# Patient Record
Sex: FEMALE | Race: WHITE | NOT HISPANIC OR LATINO | Employment: OTHER | URBAN - METROPOLITAN AREA
[De-identification: names, ages, dates, MRNs, and addresses within clinical notes are randomized per-mention and may not be internally consistent; named-entity substitution may affect disease eponyms.]

---

## 2017-01-16 PROBLEM — Z79.01 CHRONIC ANTICOAGULATION: Status: ACTIVE | Noted: 2017-01-16

## 2017-01-16 PROBLEM — F03.90 DEMENTIA: Status: ACTIVE | Noted: 2017-01-16

## 2017-01-16 PROBLEM — Z87.19 HISTORY OF GASTROINTESTINAL BLEEDING: Status: ACTIVE | Noted: 2017-01-16

## 2018-01-25 ENCOUNTER — OFFICE VISIT (OUTPATIENT)
Dept: CARDIOLOGY | Facility: CLINIC | Age: 83
End: 2018-01-25
Attending: INTERNAL MEDICINE
Payer: MEDICARE

## 2018-01-25 VITALS
HEIGHT: 64 IN | HEART RATE: 114 BPM | SYSTOLIC BLOOD PRESSURE: 143 MMHG | WEIGHT: 145 LBS | DIASTOLIC BLOOD PRESSURE: 84 MMHG | BODY MASS INDEX: 24.75 KG/M2

## 2018-01-25 DIAGNOSIS — E11.9 TYPE 2 DIABETES MELLITUS WITHOUT COMPLICATION, WITHOUT LONG-TERM CURRENT USE OF INSULIN: ICD-10-CM

## 2018-01-25 DIAGNOSIS — Z79.01 CHRONIC ANTICOAGULATION: ICD-10-CM

## 2018-01-25 DIAGNOSIS — F02.80 LATE ONSET ALZHEIMER'S DISEASE WITHOUT BEHAVIORAL DISTURBANCE: ICD-10-CM

## 2018-01-25 DIAGNOSIS — G30.1 LATE ONSET ALZHEIMER'S DISEASE WITHOUT BEHAVIORAL DISTURBANCE: ICD-10-CM

## 2018-01-25 DIAGNOSIS — E05.90 HYPERTHYROIDISM: ICD-10-CM

## 2018-01-25 DIAGNOSIS — Z87.19 HISTORY OF GASTROINTESTINAL BLEEDING: ICD-10-CM

## 2018-01-25 DIAGNOSIS — I10 ESSENTIAL HYPERTENSION: ICD-10-CM

## 2018-01-25 DIAGNOSIS — I48.20 CHRONIC ATRIAL FIBRILLATION: ICD-10-CM

## 2018-01-25 DIAGNOSIS — E78.00 HYPERCHOLESTEROLEMIA: ICD-10-CM

## 2018-01-25 PROCEDURE — 99214 OFFICE O/P EST MOD 30 MIN: CPT | Mod: S$GLB,,, | Performed by: INTERNAL MEDICINE

## 2018-01-25 RX ORDER — METHYLPHENIDATE HYDROCHLORIDE 5 MG/1
TABLET ORAL
Status: ON HOLD | COMMUNITY
Start: 2018-01-02 | End: 2018-05-07

## 2018-01-25 RX ORDER — DIGOXIN 125 MCG
125 TABLET ORAL DAILY
Qty: 90 TABLET | Refills: 3 | Status: ON HOLD | OUTPATIENT
Start: 2018-01-25 | End: 2018-05-08 | Stop reason: HOSPADM

## 2018-01-25 NOTE — PROGRESS NOTES
Subjective:     Steph Poon is a 89 y.o. female with hypertension, hypercholesterolemia and diabetes mellitus, type 2. She has chronic atrial fibrillation with a CHADS2 score of 3 (HAD) and a [id number] score of 5 (HA2DSc) being anticoagulated with apixiban. She had GI bleeding from a pyloric ulcer in 2007. In addition she has dementia being cared for by her . She was hyperthyroid in the past that may have been related to the use of amiodarone. No exertional chest pain or exertional dyspnea. No palpitations or weak spells. No bleeding. Feeling well overall it seems and according to her .    Atrial Fibrillation   Presents for follow-up visit. Symptoms include hypertension. Symptoms are negative for bradycardia, chest pain, dizziness, hemodynamic instability, hypotension, palpitations, shortness of breath, syncope, tachycardia and weakness. The symptoms have been stable. Past medical history includes atrial fibrillation and hyperlipidemia.   Hypertension   This is a chronic problem. The current episode started more than 1 year ago. The problem is unchanged. The problem is controlled (usually 130-140/70-80 mmHg at home). Pertinent negatives include no anxiety, blurred vision, chest pain, headaches, malaise/fatigue, neck pain, orthopnea, palpitations, peripheral edema, PND, shortness of breath or sweats. There is no history of chronic renal disease.   Hyperlipidemia   This is a chronic problem. The current episode started more than 1 year ago. The problem is controlled. Recent lipid tests were reviewed and are normal. Exacerbating diseases include diabetes. She has no history of chronic renal disease, hypothyroidism, liver disease, obesity or nephrotic syndrome. Pertinent negatives include no chest pain, focal sensory loss, focal weakness, leg pain, myalgias or shortness of breath.       Review of Systems   Constitution: Negative for chills, fever, weakness and malaise/fatigue.   HENT: Negative for  nosebleeds.    Eyes: Negative for blurred vision, double vision, vision loss in left eye and vision loss in right eye.   Cardiovascular: Negative for chest pain, claudication, dyspnea on exertion, irregular heartbeat, leg swelling, near-syncope, orthopnea, palpitations, paroxysmal nocturnal dyspnea and syncope.   Respiratory: Negative for cough, hemoptysis, shortness of breath and wheezing.    Endocrine: Negative for cold intolerance and heat intolerance.   Hematologic/Lymphatic: Negative for bleeding problem. Bruises/bleeds easily.   Skin: Negative for color change and rash.   Musculoskeletal: Negative for back pain, falls, muscle weakness, myalgias and neck pain.   Gastrointestinal: Negative for heartburn, hematemesis, hematochezia, hemorrhoids, jaundice, melena, nausea and vomiting.   Genitourinary: Positive for dysuria. Negative for hematuria and menorrhagia.   Neurological: Positive for difficulty with concentration. Negative for dizziness, focal weakness, headaches, light-headedness, loss of balance, numbness and vertigo.   Psychiatric/Behavioral: Positive for memory loss. Negative for altered mental status and depression. The patient is not nervous/anxious.    Allergic/Immunologic: Negative for hives and persistent infections.       Current Outpatient Prescriptions on File Prior to Visit   Medication Sig Dispense Refill    amlodipine (NORVASC) 5 MG tablet Take 5 mg by mouth once daily.   2    ascorbic acid (VITAMIN C) 500 MG tablet Take 500 mg by mouth once daily.        CALCIUM CARBONATE/VITAMIN D3 (CALTRATE 600 + D ORAL) Take 600 mg by mouth once daily.        carvedilol (COREG) 12.5 MG tablet 6.25 mg 2 (two) times daily with meals.       cholecalciferol, vitamin D3, (VITAMIN D3) 2,000 unit Cap Take 2,000 Units by mouth once daily.        furosemide (LASIX) 20 MG tablet Take 20 mg by mouth once daily. Take 3 time a week      losartan (COZAAR) 100 MG tablet Take 100 mg by mouth once daily.         "mv-min-FA-Vf-Kf-cvynhzy-lutein (CENTRUM) 0.4-162-18 mg Tab Take 1 tablet by mouth 3 (three) times a week. 1 Tablet Oral Every day      ofloxacin (FLOXIN) 0.3 % otic solution   0    ondansetron (ZOFRAN ODT) 4 MG TbDL Take 1 tablet (4 mg total) by mouth every 8 (eight) hours as needed (Nausea/Vomiting). 12 tablet 0    potassium chloride (MICRO-K) 10 MEQ CR capsule Take 10 mEq by mouth once daily.        PREMARIN vaginal cream Apply 1 application topically 3 times a week.      rosuvastatin (CRESTOR) 10 MG tablet Take 10 mg by mouth once daily. Monday, Wednesday, Friday      TRUEPLUS LANCETS 28 gauge Misc       TRUERESULT BLOOD GLUCOSE SYSTM Misc       TRUETEST LOW GLUCOSE CONTROL Soln       TRUETEST TEST STRIPS Strp       NAMENDA XR 14 mg CSpX        No current facility-administered medications on file prior to visit.        BP (!) 143/84   Pulse (!) 114   Ht 5' 4" (1.626 m)   Wt 65.8 kg (145 lb)   BMI 24.89 kg/m²       Objective:     Physical Exam   Constitutional: She is oriented to person, place, and time. She appears well-developed and well-nourished. She does not appear ill. No distress.   HENT:   Head: Normocephalic and atraumatic.   Nose: Nose normal.   Eyes: Right eye exhibits no discharge. Left eye exhibits no discharge. Right conjunctiva is not injected. Left conjunctiva is not injected. Right pupil is round. Left pupil is round. Pupils are equal.   Neck: Neck supple. No JVD present. Carotid bruit is not present. No thyromegaly present.   Cardiovascular: Normal rate, S1 normal and S2 normal.  An irregularly irregular rhythm present.  No extrasystoles are present. PMI is not displaced.  Exam reveals no gallop, no S3 and no S4.    Murmur heard.   Systolic murmur is present with a grade of 2/6  radiating to the axilla  Pulses:       Radial pulses are 2+ on the right side, and 2+ on the left side.        Femoral pulses are 2+ on the right side, and 2+ on the left side.       Dorsalis pedis pulses " are 2+ on the right side, and 2+ on the left side.        Posterior tibial pulses are 2+ on the right side, and 2+ on the left side.   Pulmonary/Chest: Effort normal and breath sounds normal.   Abdominal: Soft. Normal appearance. There is no hepatosplenomegaly. There is no tenderness.   Musculoskeletal:        Right ankle: She exhibits no swelling, no ecchymosis and no deformity.        Left ankle: She exhibits no swelling, no ecchymosis and no deformity.   Lymphadenopathy:        Head (right side): No submandibular adenopathy present.        Head (left side): No submandibular adenopathy present.     She has no cervical adenopathy.   Neurological: She is alert and oriented to person, place, and time. She is not disoriented. No cranial nerve deficit or sensory deficit.   Skin: Skin is warm, dry and intact. No rash noted. She is not diaphoretic. No cyanosis. Nails show no clubbing.   Psychiatric: She has a normal mood and affect. Her speech is normal and behavior is normal. Thought content normal. Cognition and memory are impaired. She exhibits abnormal recent memory and abnormal remote memory.       Assessment:     1. Chronic atrial fibrillation    2. Chronic anticoagulation    3. Essential hypertension    4. Hypercholesterolemia    5. Type 2 diabetes mellitus without complication, without long-term current use of insulin    6. Late onset Alzheimer's disease without behavioral disturbance    7. Hyperthyroidism    8. History of gastrointestinal bleeding        Plan:     1. Atrial Fibrillation   1998: Diagnosed with chronic atrial fibrillation.   CHADS2=3 (HAD). [id number]=7 (HA2DSc).   On apixiban.   On carvedilol 6.25 mg Q12.   Used to take digoxin 0.125 mg 4/7 days.   Rate appears on fast side.   Resume digoxin 0.125 mg Q24.    2. Chronic Anticoagulation   1998: Diagnosed with chronic atrial fibrillation.   CHADS2=3 (HAD). [id number]=4 (HA2DSc).   On apixiban 2.5 mg Q12.   No aspirin or NSAID.   No bleeding.    3.  Hypertension   1990: Diagnosed.   Carvedilol 6.25 mg Q12, amlodipine 5 mg Q24, losartan 100 mg Q24, furosemide 20 mg Q24 and KCL 10 mEq Q24.   Keeping log at home.   Well controlled.    4. Hypercholesterolemia   1990: Began statin.   On rosuvastatin 10 mg 3/7 days.   Tells me well controlled.    5. Diabetes Mellitus, Type 2   2000: Diagnosed. Complications: None. Medications: Diet.    6. Dementia   2014: Diagnosed.   On Namenda.    7. Hyperthyroidism   2010: Diagnosed. 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    8. History of Gastrointestinal Bleeding   2007: Bleeding from pyloric ulcer.   No recurrence.   No aspirin or NSAID.   No bleeding.    9. Primary Care   Dr. Stef Murry, Sr..    F/u 4 months.    Ronald Medrano M.D.

## 2018-05-03 ENCOUNTER — HOSPITAL ENCOUNTER (INPATIENT)
Facility: OTHER | Age: 83
LOS: 5 days | Discharge: HOSPICE | DRG: 388 | End: 2018-05-08
Attending: EMERGENCY MEDICINE | Admitting: INTERNAL MEDICINE
Payer: MEDICARE

## 2018-05-03 DIAGNOSIS — R10.9 ABDOMINAL PAIN: ICD-10-CM

## 2018-05-03 DIAGNOSIS — K57.90 DIVERTICULOSIS OF INTESTINE WITHOUT BLEEDING, UNSPECIFIED INTESTINAL TRACT LOCATION: ICD-10-CM

## 2018-05-03 DIAGNOSIS — K80.50 CHOLEDOCHOLITHIASIS: ICD-10-CM

## 2018-05-03 DIAGNOSIS — N17.9 ACUTE KIDNEY INJURY: ICD-10-CM

## 2018-05-03 DIAGNOSIS — K56.41 FECAL IMPACTION: ICD-10-CM

## 2018-05-03 DIAGNOSIS — K80.20 CHOLELITHIASIS WITHOUT CHOLECYSTITIS: Primary | ICD-10-CM

## 2018-05-03 DIAGNOSIS — R73.9 HYPERGLYCEMIA: ICD-10-CM

## 2018-05-03 DIAGNOSIS — K59.39 MEGACOLON: ICD-10-CM

## 2018-05-03 LAB
ALBUMIN SERPL BCP-MCNC: 3.4 G/DL
ALP SERPL-CCNC: 65 U/L
ALT SERPL W/O P-5'-P-CCNC: 11 U/L
ANION GAP SERPL CALC-SCNC: 13 MMOL/L
AST SERPL-CCNC: 15 U/L
BASOPHILS # BLD AUTO: 0.01 K/UL
BASOPHILS NFR BLD: 0.1 %
BILIRUB SERPL-MCNC: 0.9 MG/DL
BILIRUB UR QL STRIP: NEGATIVE
BUN SERPL-MCNC: 55 MG/DL
CALCIUM SERPL-MCNC: 10.8 MG/DL
CHLORIDE SERPL-SCNC: 103 MMOL/L
CLARITY UR: CLEAR
CO2 SERPL-SCNC: 24 MMOL/L
COLOR UR: YELLOW
CREAT SERPL-MCNC: 1.5 MG/DL
DIFFERENTIAL METHOD: ABNORMAL
EOSINOPHIL # BLD AUTO: 0.1 K/UL
EOSINOPHIL NFR BLD: 1.1 %
ERYTHROCYTE [DISTWIDTH] IN BLOOD BY AUTOMATED COUNT: 13 %
EST. GFR  (AFRICAN AMERICAN): 35 ML/MIN/1.73 M^2
EST. GFR  (NON AFRICAN AMERICAN): 30 ML/MIN/1.73 M^2
GLUCOSE SERPL-MCNC: 329 MG/DL
GLUCOSE UR QL STRIP: NEGATIVE
HCT VFR BLD AUTO: 41.5 %
HGB BLD-MCNC: 14 G/DL
HGB UR QL STRIP: ABNORMAL
INR PPP: 1
KETONES UR QL STRIP: NEGATIVE
LEUKOCYTE ESTERASE UR QL STRIP: NEGATIVE
LIPASE SERPL-CCNC: 35 U/L
LYMPHOCYTES # BLD AUTO: 1.8 K/UL
LYMPHOCYTES NFR BLD: 13.7 %
MCH RBC QN AUTO: 33.2 PG
MCHC RBC AUTO-ENTMCNC: 33.7 G/DL
MCV RBC AUTO: 98 FL
MONOCYTES # BLD AUTO: 0.7 K/UL
MONOCYTES NFR BLD: 5.4 %
NEUTROPHILS # BLD AUTO: 10.2 K/UL
NEUTROPHILS NFR BLD: 79.4 %
NITRITE UR QL STRIP: NEGATIVE
PH UR STRIP: 6 [PH] (ref 5–8)
PLATELET # BLD AUTO: 235 K/UL
PMV BLD AUTO: 11.4 FL
POCT GLUCOSE: 203 MG/DL (ref 70–110)
POTASSIUM SERPL-SCNC: 4.2 MMOL/L
PROT SERPL-MCNC: 7.1 G/DL
PROT UR QL STRIP: NEGATIVE
PROTHROMBIN TIME: 11.1 SEC
RBC # BLD AUTO: 4.22 M/UL
SODIUM SERPL-SCNC: 140 MMOL/L
SP GR UR STRIP: 1.02 (ref 1–1.03)
URN SPEC COLLECT METH UR: ABNORMAL
UROBILINOGEN UR STRIP-ACNC: 1 EU/DL
WBC # BLD AUTO: 12.81 K/UL

## 2018-05-03 PROCEDURE — 83690 ASSAY OF LIPASE: CPT

## 2018-05-03 PROCEDURE — 82962 GLUCOSE BLOOD TEST: CPT

## 2018-05-03 PROCEDURE — 80053 COMPREHEN METABOLIC PANEL: CPT

## 2018-05-03 PROCEDURE — 85610 PROTHROMBIN TIME: CPT

## 2018-05-03 PROCEDURE — 93005 ELECTROCARDIOGRAM TRACING: CPT

## 2018-05-03 PROCEDURE — 99285 EMERGENCY DEPT VISIT HI MDM: CPT | Mod: 25

## 2018-05-03 PROCEDURE — G0378 HOSPITAL OBSERVATION PER HR: HCPCS

## 2018-05-03 PROCEDURE — 81003 URINALYSIS AUTO W/O SCOPE: CPT

## 2018-05-03 PROCEDURE — 25500020 PHARM REV CODE 255: Performed by: EMERGENCY MEDICINE

## 2018-05-03 PROCEDURE — 93010 ELECTROCARDIOGRAM REPORT: CPT | Mod: ,,, | Performed by: INTERNAL MEDICINE

## 2018-05-03 PROCEDURE — 85025 COMPLETE CBC W/AUTO DIFF WBC: CPT

## 2018-05-03 PROCEDURE — 12000002 HC ACUTE/MED SURGE SEMI-PRIVATE ROOM

## 2018-05-03 RX ADMIN — IOHEXOL 25 ML: 350 INJECTION, SOLUTION INTRAVENOUS at 08:05

## 2018-05-03 NOTE — ED PROVIDER NOTES
"Encounter Date: 5/3/2018    SCRIBE #1 NOTE: I, Indiana Hsu, am scribing for, and in the presence of, Dr. Horton.       History     Chief Complaint   Patient presents with    Abdominal Pain     distention and diarrhea x2 days     Time seen by provider: 4:38 PM    This is a 89 y.o. female, with history including Alzheimer's dementia and atrial fibrillation, who was brought in by spouse for concern of gradually excessive sleeping over the past six months. He states that patient was evaluated for this and informed that it was attributed to her Alzheimer's progression. Her spouse additionally reports abdominal distention which was noticed when he woke the patient up today at 10AM. He states that this is new as of today. He reports that the patient has had "loose watery" bowel movements over the 2-3 days which is also new. Spouse states that the patient has not been consuming solid foods over the past week. He states that she was previously eating scrambled eggs but has since been consuming 3-4 ensure drinks per day. He denies fever, chills, or vomiting. Spouse denies recent abx use. He also denies patient having a history of C-diff or any abdominal surgeries. Spouse reports himself as possible recent sick contact. He denies any new medications to the patient's routine meds. She sees Dr. Medrano. Patient's history and review of systems are provided by spouse/caregiver secondary to her dementia status.       The history is provided by the spouse.     Review of patient's allergies indicates:   Allergen Reactions    Ace inhibitors Other (See Comments)     cough    Quinine Other (See Comments)     unknown     Past Medical History:   Diagnosis Date    Atrial fibrillation     Chronic anticoagulation 1/16/2017 1998: Diagnosed with chronic atrial fibrillation. CHADS2=3 (HAD). [id number]=1 (HA2DSc). On rivaroxaban.    Dementia 1/16/2017 2014: Diagnosed.    DM (diabetes mellitus)     History of DM    Hip fracture, " "right     History of gastrointestinal bleeding 1/16/2017    2007: Bleeding from pyloric ulcer.    Hypertension     Hyperthyroidism     Meningitis 15 years ago     Past Surgical History:   Procedure Laterality Date    cataract surgery      both eyes    HIP FRACTURE SURGERY  2011    Left hip     KNEE SURGERY      Right knee replacement    uterine sling       Family History   Problem Relation Age of Onset    Heart disease Brother     Heart disease Mother     Heart disease Father      Social History   Substance Use Topics    Smoking status: Former Smoker     Packs/day: 1.50     Years: 20.00     Types: Cigarettes     Start date: 1/1/1947     Quit date: 1/1/1967    Smokeless tobacco: Never Used    Alcohol use Yes     Review of Systems   Constitutional: Positive for activity change (increased sleeping per spouse) and appetite change. Negative for chills and fever.   HENT: Negative for sore throat.    Respiratory: Negative for shortness of breath.    Cardiovascular: Negative for chest pain.   Gastrointestinal: Positive for abdominal distention and diarrhea ("loose, watery stools"). Negative for nausea and vomiting.   Genitourinary: Negative for dysuria.   Musculoskeletal: Negative for back pain.   Skin: Negative for rash and wound.   Allergic/Immunologic: Negative for immunocompromised state.   Neurological: Negative for seizures and syncope.       Physical Exam     Initial Vitals [05/03/18 1617]   BP Pulse Resp Temp SpO2   (!) 113/54 69 18 96.9 °F (36.1 °C) 95 %      MAP       73.67         Physical Exam    Nursing note and vitals reviewed.  Constitutional: She appears well-developed and well-nourished. She is not diaphoretic. No distress.   HENT:   Head: Normocephalic and atraumatic.   Sticky mucous membranes.   Eyes: EOM are normal. Pupils are equal, round, and reactive to light. Right eye exhibits no discharge. Left eye exhibits no discharge.   Neck: Normal range of motion. Neck supple.   Cardiovascular: " Normal rate, regular rhythm and normal heart sounds. Exam reveals no gallop and no friction rub.    No murmur heard.  Pulmonary/Chest: Breath sounds normal. No respiratory distress. She has no wheezes. She has no rhonchi. She has no rales.   Clear to ascultation bilaterally.   Abdominal: Soft. She exhibits distension. There is tenderness. There is no rebound and no guarding.   Generalized mild tenderness to palpation.   Musculoskeletal: She exhibits no edema or tenderness.   Neurological:   Unable to follow commands secondary to chronic dementia.   Skin: Skin is warm and dry. Capillary refill takes less than 2 seconds. No rash and no abscess noted. No erythema. No pallor.   Warm and dry. No skin tenting, rashes, or lesions.          ED Course   Procedures  Labs Reviewed   CBC W/ AUTO DIFFERENTIAL - Abnormal; Notable for the following:        Result Value    WBC 12.81 (*)     MCH 33.2 (*)     Gran # (ANC) 10.2 (*)     Gran% 79.4 (*)     Lymph% 13.7 (*)     All other components within normal limits   COMPREHENSIVE METABOLIC PANEL - Abnormal; Notable for the following:     Glucose 329 (*)     BUN, Bld 55 (*)     Creatinine 1.5 (*)     Calcium 10.8 (*)     Albumin 3.4 (*)     eGFR if  35 (*)     eGFR if non  30 (*)     All other components within normal limits   URINALYSIS - Abnormal; Notable for the following:     Occult Blood UA Trace (*)     All other components within normal limits   PROTIME-INR   LIPASE   LIPASE   POCT GLUCOSE MONITORING CONTINUOUS      Imaging Results          X-Ray Abdomen AP 1 View (KUB) (In process)                X-Ray Abdomen Flat And Erect (Final result)     Abnormal  Result time 05/03/18 17:16:58    Final result by Oralia Farr MD (05/03/18 17:16:58)                 Impression:      As above.    This report was flagged in Epic as abnormal.      Electronically signed by: Oralia Farr MD  Date:    05/03/2018  Time:    17:16             Narrative:     EXAMINATION:  This report was flagged in Epic as abnormal.    XR ABDOMEN FLAT AND ERECT    CLINICAL HISTORY:  Abdominal Distention;    TECHNIQUE:  Flat and erect AP views of the abdomen were performed.    COMPARISON:  April 19, 2016    FINDINGS:  There is diffuse gaseous distension of the visualized intestinal loops with a segment of colon in the mid abdomen measuring up to 11.3 cm in diameter.  No free air is identified on the upright image.  There are degenerative changes of the spine and pubic symphysis with a lumbar levoscoliosis.  There are surgical changes in left hip.                               X-Ray Chest AP Portable (Final result)  Result time 05/03/18 17:13:13    Final result by Benoit Levy MD (05/03/18 17:13:13)                 Impression:      No acute intrathoracic process.      Electronically signed by: Benoit Levy MD  Date:    05/03/2018  Time:    17:13             Narrative:    EXAMINATION:  XR CHEST AP PORTABLE    CLINICAL HISTORY:  Abdominal Pain;    TECHNIQUE:  Single frontal view of the chest was performed.    COMPARISON:  04/19/2016    FINDINGS:  Monitoring EKG leads are present.  The trachea is unremarkable.  There are calcifications of the aortic knob.  There are also extensive coronary artery calcifications.  There is stable prominence of the cardiomediastinal silhouette.  The hemidiaphragms are unremarkable.  There is no evidence of free air beneath the hemidiaphragms.  There are no pleural effusions.  There is no evidence of a pneumothorax.  There is no evidence of pneumomediastinum.  No airspace opacity is present.  There is stable appearance of coarse pulmonary interstitial markings.  There is subsegmental atelectasis in the left lung base.  There are degenerative changes in the osseous structures.                                EKG Readings: (Independently Interpreted)   Initial Reading: No STEMI.   Atrial fibrillation at a rate of 61 bpm. No STEMI. No acute ischemia.       X-Rays:    Independently Interpreted Readings:   Chest X-Ray: No acute findings visualized.   Abdomen: Large dilated loop of colon measuring 11.3cm.     Medical Decision Making:   Independently Interpreted Test(s):   I have ordered and independently interpreted X-rays - see prior notes.  I have ordered and independently interpreted EKG Reading(s) - see prior notes  Clinical Tests:   Lab Tests: Ordered and Reviewed  Radiological Study: Ordered and Reviewed  Medical Tests: Ordered and Reviewed            Scribe Attestation:   Scribe #1: I performed the above scribed service and the documentation accurately describes the services I performed. I attest to the accuracy of the note.    Attending Attestation:           Physician Attestation for Scribe:  Physician Attestation Statement for Scribe #1: I, Dr. Horton, reviewed documentation, as scribed by Indiana Hsu in my presence, and it is both accurate and complete.         Attending ED Notes:   Emergent evaluation of 89-year-old female with complaint of abdominal pain and distention.  Patient is afebrile, nontoxic-appearing with stable vital signs.  Repeat oxygen saturation by M.D. is 97% on room air.  Patient is white blood cell count of 13,000.  H&H within normal limits.  BUN/creatinine are 55 and 1.5.  Calcium is 10.8.  Blood glucose is 329.  No clinical or diagnostic evidence of DKA.  Anion gap of 13.  Bicarbonate 24.  No acute findings on chest x-ray.  Abdominal x-ray reveals diffuse gaseous distention of the intestinal loops.  Patient is unable to drink contrast dye.  NG tube is placed for by mouth contrast dye.  NG tube removed after contrast dye was administered by NG tube.  CT scan reveals diverticulosis without evidence of diverticulitis, cholelithiasis and choledocholithiasis and large copious amount of stool within the large bowel with distention of large bowel.  Maximum diameter the transverse colon is 10 cm.         10:55 PM I consulted and discussed patient with  general surgery on-call, Dr. Plaza including but not limited to history present illness, review of systems, physical exam and all diagnostic, laboratory findings.  Recommendation is to admit the hospitalist.  Dr. Plaza will see the patient in the morning.    11:05 PM I consulted and discussed patient with hospitalist on-call who will admit the patient.             Clinical Impression:     1. Abdominal pain    2. Impaired nasal gastric feeding tube                                 Renzo Sanabria MD  05/03/18 [id number]

## 2018-05-03 NOTE — ED NOTES
Patient presents to the [facility] with  for a complaint of abdominal distention. Per  patient has been having decreased appetite over the last few months, and patient has been gradually sleeping more and more.  states that she has also been having diarrhea for the last 3 days. Patient has not been able to eat solid foods for the last few weeks she has been only drinking ensure. On assessment patient has generalized abdomen tenderness. Bowel sounds active in all quadrants, patient does have a soft distended abdomen. Patient oriented to name and  only. Patient is poor at following commands. Breath sounds clear to auscultation. Patient does have a cardiac history of Afib and is currently afib on the monitor.  at bedside, both side rails raised on bed, call light placed on bed near patient's

## 2018-05-04 PROBLEM — R73.9 HYPERGLYCEMIA: Status: ACTIVE | Noted: 2018-05-04

## 2018-05-04 PROBLEM — N17.9 ACUTE KIDNEY INJURY: Status: ACTIVE | Noted: 2018-05-04

## 2018-05-04 PROBLEM — K80.50 CHOLEDOCHOLITHIASIS: Status: ACTIVE | Noted: 2018-05-04

## 2018-05-04 PROBLEM — K56.41 FECAL IMPACTION: Status: ACTIVE | Noted: 2018-05-03

## 2018-05-04 PROBLEM — K59.39 MEGACOLON: Status: ACTIVE | Noted: 2018-05-04

## 2018-05-04 LAB
ANION GAP SERPL CALC-SCNC: 11 MMOL/L
B-OH-BUTYR BLD STRIP-SCNC: 0.1 MMOL/L
BASOPHILS # BLD AUTO: 0.02 K/UL
BASOPHILS NFR BLD: 0.2 %
BUN SERPL-MCNC: 51 MG/DL
CALCIUM SERPL-MCNC: 9.9 MG/DL
CHLORIDE SERPL-SCNC: 106 MMOL/L
CO2 SERPL-SCNC: 23 MMOL/L
CREAT SERPL-MCNC: 1.1 MG/DL
DIFFERENTIAL METHOD: ABNORMAL
EOSINOPHIL # BLD AUTO: 0.1 K/UL
EOSINOPHIL NFR BLD: 1.1 %
ERYTHROCYTE [DISTWIDTH] IN BLOOD BY AUTOMATED COUNT: 13 %
EST. GFR  (AFRICAN AMERICAN): 51 ML/MIN/1.73 M^2
EST. GFR  (NON AFRICAN AMERICAN): 44 ML/MIN/1.73 M^2
GLUCOSE SERPL-MCNC: 172 MG/DL
HCT VFR BLD AUTO: 39.6 %
HGB BLD-MCNC: 13.3 G/DL
LYMPHOCYTES # BLD AUTO: 1.7 K/UL
LYMPHOCYTES NFR BLD: 13.7 %
MAGNESIUM SERPL-MCNC: 2.1 MG/DL
MCH RBC QN AUTO: 33 PG
MCHC RBC AUTO-ENTMCNC: 33.6 G/DL
MCV RBC AUTO: 98 FL
MONOCYTES # BLD AUTO: 0.8 K/UL
MONOCYTES NFR BLD: 6 %
NEUTROPHILS # BLD AUTO: 9.8 K/UL
NEUTROPHILS NFR BLD: 78.7 %
PHOSPHATE SERPL-MCNC: 3 MG/DL
PLATELET # BLD AUTO: 201 K/UL
PMV BLD AUTO: 11.1 FL
POCT GLUCOSE: 166 MG/DL (ref 70–110)
POCT GLUCOSE: 178 MG/DL (ref 70–110)
POCT GLUCOSE: 186 MG/DL (ref 70–110)
POTASSIUM SERPL-SCNC: 3.6 MMOL/L
RBC # BLD AUTO: 4.03 M/UL
SODIUM SERPL-SCNC: 140 MMOL/L
WBC # BLD AUTO: 12.51 K/UL

## 2018-05-04 PROCEDURE — 25000003 PHARM REV CODE 250: Performed by: PHYSICIAN ASSISTANT

## 2018-05-04 PROCEDURE — 82010 KETONE BODYS QUAN: CPT

## 2018-05-04 PROCEDURE — 25000003 PHARM REV CODE 250: Performed by: INTERNAL MEDICINE

## 2018-05-04 PROCEDURE — 99900035 HC TECH TIME PER 15 MIN (STAT)

## 2018-05-04 PROCEDURE — 94761 N-INVAS EAR/PLS OXIMETRY MLT: CPT

## 2018-05-04 PROCEDURE — 36415 COLL VENOUS BLD VENIPUNCTURE: CPT

## 2018-05-04 PROCEDURE — 99233 SBSQ HOSP IP/OBS HIGH 50: CPT | Mod: ,,, | Performed by: INTERNAL MEDICINE

## 2018-05-04 PROCEDURE — 80048 BASIC METABOLIC PNL TOTAL CA: CPT

## 2018-05-04 PROCEDURE — 83735 ASSAY OF MAGNESIUM: CPT

## 2018-05-04 PROCEDURE — 85025 COMPLETE CBC W/AUTO DIFF WBC: CPT

## 2018-05-04 PROCEDURE — 84100 ASSAY OF PHOSPHORUS: CPT

## 2018-05-04 PROCEDURE — 99220 PR INITIAL OBSERVATION CARE,LEVL III: CPT | Mod: ,,, | Performed by: PHYSICIAN ASSISTANT

## 2018-05-04 PROCEDURE — 11000001 HC ACUTE MED/SURG PRIVATE ROOM

## 2018-05-04 RX ORDER — MORPHINE SULFATE 2 MG/ML
1 INJECTION, SOLUTION INTRAMUSCULAR; INTRAVENOUS EVERY 6 HOURS PRN
Status: DISCONTINUED | OUTPATIENT
Start: 2018-05-04 | End: 2018-05-07

## 2018-05-04 RX ORDER — ACETAMINOPHEN 325 MG/1
650 TABLET ORAL EVERY 4 HOURS PRN
Status: DISCONTINUED | OUTPATIENT
Start: 2018-05-04 | End: 2018-05-07

## 2018-05-04 RX ORDER — SODIUM CHLORIDE 0.9 % (FLUSH) 0.9 %
5 SYRINGE (ML) INJECTION
Status: DISCONTINUED | OUTPATIENT
Start: 2018-05-04 | End: 2018-05-08 | Stop reason: HOSPADM

## 2018-05-04 RX ORDER — ONDANSETRON 8 MG/1
8 TABLET, ORALLY DISINTEGRATING ORAL EVERY 8 HOURS PRN
Status: DISCONTINUED | OUTPATIENT
Start: 2018-05-04 | End: 2018-05-07

## 2018-05-04 RX ORDER — DIGOXIN 125 MCG
125 TABLET ORAL DAILY
Status: DISCONTINUED | OUTPATIENT
Start: 2018-05-04 | End: 2018-05-07

## 2018-05-04 RX ORDER — IBUPROFEN 200 MG
16 TABLET ORAL
Status: DISCONTINUED | OUTPATIENT
Start: 2018-05-04 | End: 2018-05-07

## 2018-05-04 RX ORDER — AMLODIPINE BESYLATE 5 MG/1
5 TABLET ORAL DAILY
Status: DISCONTINUED | OUTPATIENT
Start: 2018-05-04 | End: 2018-05-07

## 2018-05-04 RX ORDER — ROSUVASTATIN CALCIUM 10 MG/1
10 TABLET, COATED ORAL DAILY
Status: DISCONTINUED | OUTPATIENT
Start: 2018-05-04 | End: 2018-05-07

## 2018-05-04 RX ORDER — GLUCAGON 1 MG
1 KIT INJECTION
Status: DISCONTINUED | OUTPATIENT
Start: 2018-05-04 | End: 2018-05-07

## 2018-05-04 RX ORDER — CARVEDILOL 6.25 MG/1
6.25 TABLET ORAL 2 TIMES DAILY WITH MEALS
Status: DISCONTINUED | OUTPATIENT
Start: 2018-05-04 | End: 2018-05-07

## 2018-05-04 RX ORDER — SODIUM CHLORIDE 9 MG/ML
INJECTION, SOLUTION INTRAVENOUS CONTINUOUS
Status: DISCONTINUED | OUTPATIENT
Start: 2018-05-04 | End: 2018-05-07

## 2018-05-04 RX ORDER — POLYETHYLENE GLYCOL 3350 17 G/17G
17 POWDER, FOR SOLUTION ORAL 2 TIMES DAILY
Status: DISCONTINUED | OUTPATIENT
Start: 2018-05-04 | End: 2018-05-07

## 2018-05-04 RX ORDER — INSULIN ASPART 100 [IU]/ML
0-5 INJECTION, SOLUTION INTRAVENOUS; SUBCUTANEOUS
Status: DISCONTINUED | OUTPATIENT
Start: 2018-05-04 | End: 2018-05-07

## 2018-05-04 RX ORDER — LOSARTAN POTASSIUM 50 MG/1
100 TABLET ORAL DAILY
Status: DISCONTINUED | OUTPATIENT
Start: 2018-05-04 | End: 2018-05-07

## 2018-05-04 RX ORDER — IBUPROFEN 200 MG
24 TABLET ORAL
Status: DISCONTINUED | OUTPATIENT
Start: 2018-05-04 | End: 2018-05-07

## 2018-05-04 RX ORDER — CHOLECALCIFEROL (VITAMIN D3) 25 MCG
2000 TABLET ORAL DAILY
Status: DISCONTINUED | OUTPATIENT
Start: 2018-05-04 | End: 2018-05-07

## 2018-05-04 RX ORDER — RAMELTEON 8 MG/1
8 TABLET ORAL NIGHTLY PRN
Status: DISCONTINUED | OUTPATIENT
Start: 2018-05-04 | End: 2018-05-07

## 2018-05-04 RX ADMIN — VITAMIN D, TAB 1000IU (100/BT) 2000 UNITS: 25 TAB at 09:05

## 2018-05-04 RX ADMIN — DIGOXIN 125 MCG: 125 TABLET ORAL at 09:05

## 2018-05-04 RX ADMIN — AMLODIPINE BESYLATE 5 MG: 5 TABLET ORAL at 09:05

## 2018-05-04 RX ADMIN — SODIUM CHLORIDE: 0.9 INJECTION, SOLUTION INTRAVENOUS at 03:05

## 2018-05-04 RX ADMIN — CARVEDILOL 6.25 MG: 12.5 TABLET, FILM COATED ORAL at 06:05

## 2018-05-04 RX ADMIN — ROSUVASTATIN CALCIUM 10 MG: 10 TABLET, FILM COATED ORAL at 09:05

## 2018-05-04 RX ADMIN — LOSARTAN POTASSIUM 100 MG: 50 TABLET, FILM COATED ORAL at 09:05

## 2018-05-04 RX ADMIN — POLYETHYLENE GLYCOL 3350 17 G: 17 POWDER, FOR SOLUTION ORAL at 06:05

## 2018-05-04 RX ADMIN — CARVEDILOL 6.25 MG: 12.5 TABLET, FILM COATED ORAL at 09:05

## 2018-05-04 NOTE — SUBJECTIVE & OBJECTIVE
Past Medical History:   Diagnosis Date    Atrial fibrillation     Chronic anticoagulation 1/16/2017 1998: Diagnosed with chronic atrial fibrillation. CHADS2=3 (HAD). [id number]=0 (HA2DSc). On rivaroxaban.    Dementia 1/16/2017 2014: Diagnosed.    DM (diabetes mellitus)     History of DM    Hip fracture, right     History of gastrointestinal bleeding 1/16/2017 2007: Bleeding from pyloric ulcer.    Hypertension     Hyperthyroidism     Meningitis 15 years ago       Past Surgical History:   Procedure Laterality Date    cataract surgery      both eyes    HIP FRACTURE SURGERY  2011    Left hip     KNEE SURGERY      Right knee replacement    uterine sling         Review of patient's allergies indicates:   Allergen Reactions    Ace inhibitors Other (See Comments)     cough    Quinine Other (See Comments)     unknown       No current facility-administered medications on file prior to encounter.      Current Outpatient Prescriptions on File Prior to Encounter   Medication Sig    amlodipine (NORVASC) 5 MG tablet Take 5 mg by mouth once daily.     apixaban 2.5 mg Tab Take 1 tablet (2.5 mg total) by mouth 2 (two) times daily.    ascorbic acid (VITAMIN C) 500 MG tablet Take 500 mg by mouth once daily.      CALCIUM CARBONATE/VITAMIN D3 (CALTRATE 600 + D ORAL) Take 600 mg by mouth once daily.      carvedilol (COREG) 12.5 MG tablet 6.25 mg 2 (two) times daily with meals.     cholecalciferol, vitamin D3, (VITAMIN D3) 2,000 unit Cap Take 2,000 Units by mouth once daily.      digoxin (LANOXIN) 125 mcg tablet Take 1 tablet (125 mcg total) by mouth once daily.    furosemide (LASIX) 20 MG tablet Take 20 mg by mouth once daily. Take 3 time a week    losartan (COZAAR) 100 MG tablet Take 100 mg by mouth once daily.      potassium chloride (MICRO-K) 10 MEQ CR capsule Take 10 mEq by mouth once daily.      rosuvastatin (CRESTOR) 10 MG tablet Take 10 mg by mouth once daily. Monday, Wednesday, Friday     methylphenidate HCl (RITALIN) 5 MG tablet     mv-min-FA-Jk-Jm-idyoylu-lutein (CENTRUM) 0.4-162-18 mg Tab Take 1 tablet by mouth 3 (three) times a week. 1 Tablet Oral Every day    NAMENDA XR 14 mg CSpX     ofloxacin (FLOXIN) 0.3 % otic solution     ondansetron (ZOFRAN ODT) 4 MG TbDL Take 1 tablet (4 mg total) by mouth every 8 (eight) hours as needed (Nausea/Vomiting).    PREMARIN vaginal cream Apply 1 application topically 3 times a week.    TRUEPLUS LANCETS 28 gauge Misc     TRUERESULT BLOOD GLUCOSE SYSTM Misc     TRUETEST LOW GLUCOSE CONTROL Soln     TRUETEST TEST STRIPS Strp      Family History     Problem Relation (Age of Onset)    Heart disease Brother, Mother, Father        Social History Main Topics    Smoking status: Former Smoker     Packs/day: 1.50     Years: 20.00     Types: Cigarettes     Start date: 1/1/1947     Quit date: 1/1/1967    Smokeless tobacco: Never Used    Alcohol use Yes    Drug use: No    Sexual activity: Yes     Partners: Male     Review of Systems   Unable to perform ROS: Dementia     Objective:     Vital Signs (Most Recent):  Temp: 97 °F (36.1 °C) (05/04/18 0439)  Pulse: (!) 57 (05/04/18 0700)  Resp: 18 (05/04/18 0535)  BP: 132/62 (05/04/18 0439)  SpO2: 97 % (05/04/18 0535) Vital Signs (24h Range):  Temp:  [96.9 °F (36.1 °C)-97.6 °F (36.4 °C)] 97 °F (36.1 °C)  Pulse:  [57-80] 57  Resp:  [18] 18  SpO2:  [93 %-97 %] 97 %  BP: (113-132)/(51-64) 132/62     Weight: 64.2 kg (141 lb 8.6 oz)  Body mass index is 24.29 kg/m².    Physical Exam   Constitutional: She appears well-developed and well-nourished. No distress.   HENT:   Head: Normocephalic and atraumatic.   Eyes: Conjunctivae and EOM are normal. Pupils are equal, round, and reactive to light. No scleral icterus.   Neck: Normal range of motion. Neck supple. No tracheal deviation present.   Cardiovascular: Normal rate, regular rhythm, normal heart sounds and intact distal pulses.  Exam reveals no gallop and no friction rub.     No murmur heard.  Pulmonary/Chest: Effort normal and breath sounds normal. No stridor. No respiratory distress. She has no wheezes. She has no rales.   Abdominal: Soft. Bowel sounds are normal. She exhibits distension. She exhibits no mass. There is no tenderness. There is no guarding.   Decreased bowel sounds, increased tympany.    Neurological: She is alert.   Skin: Skin is warm and dry. She is not diaphoretic.   Psychiatric: She has a normal mood and affect. Her behavior is normal. Judgment and thought content normal.   Nursing note and vitals reviewed.        CRANIAL NERVES     CN III, IV, VI   Pupils are equal, round, and reactive to light.  Extraocular motions are normal.        Significant Labs:   BMP:   Recent Labs  Lab 05/04/18  0557   *      K 3.6      CO2 23   BUN 51*   CREATININE 1.1   CALCIUM 9.9   MG 2.1     CBC:   Recent Labs  Lab 05/03/18  1648 05/04/18  0557   WBC 12.81* 12.51   HGB 14.0 13.3   HCT 41.5 39.6    201     CMP:   Recent Labs  Lab 05/03/18  1648 05/04/18  0557    140   K 4.2 3.6    106   CO2 24 23   * 172*   BUN 55* 51*   CREATININE 1.5* 1.1   CALCIUM 10.8* 9.9   PROT 7.1  --    ALBUMIN 3.4*  --    BILITOT 0.9  --    ALKPHOS 65  --    AST 15  --    ALT 11  --    ANIONGAP 13 11   EGFRNONAA 30* 44*     All pertinent labs within the past 24 hours have been reviewed.    Significant Imaging: I have reviewed all pertinent imaging results/findings within the past 24 hours.   Imaging Results          CT Abdomen Pelvis  Without Contrast (Final result)     Abnormal  Result time 05/03/18 22:23:15    Final result by Benoit Levy MD (05/03/18 22:23:15)                 Impression:      Large copious amount of stool within the large bowel with distention of the large bowel and prominence of the haustral folds.  The maximum diameter of the transverse colon is 10 cm.  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  Surgical consultation is  recommended.    Wall thickening of the small bowel loops with interloop fluid within the small bowel loops in the right lower abdominal quadrant.  The findings represent nonspecific enteritis.  Early ischemia is not entirely excluded.    Diverticulosis coli without evidence of acute diverticulitis.    Cholelithiasis and choledocholithiasis.    Additional findings as above.    This report was flagged in Epic as abnormal.      Electronically signed by: Benoit Levy MD  Date:    05/03/2018  Time:    22:23             Narrative:    EXAMINATION:  CT ABDOMEN PELVIS WITHOUT CONTRAST    CLINICAL HISTORY:  Abdominal pain, unspecified;    TECHNIQUE:  Axial CT scan of the abdomen and pelvis was performed from the level of the hemidiaphragms to the pubic symphysis without intravenous contrast.  Oral contrast was administered.    COMPARISON:  X-ray of the abdomen dated 05/03/2018    FINDINGS:  There are small bilateral pleural effusions.  There is minimal compressive atelectasis in the lung bases.  There is bronchiectasis in the left lung base.  There is no evidence of a pneumothorax.    The heart is enlarged.  There are coronary artery calcifications.  The may be trace pericardial effusion.  There are extensive calcifications along the course of the abdominal aorta and its branch vessels.  No portal venous or mesenteric air is identified.  There is no evidence of lymphadenopathy in the abdomen or pelvis.    The esophagus is within normal limits.  The stomach is distended.  The duodenum is unremarkable.  There is distention of the small bowel loops with associated wall thickening of the small bowel loops.  The appendix is not identified.  There are no secondary findings of acute appendicitis.  There is extensive colonic diverticular centered within the sigmoid colon.  There are no inflammatory changes within the sigmoid mesocolon.  There is large copious amount of stool within the distal sigmoid colon and the remainder of the  large bowel.  There are air-fluid levels within the large bowel.  There is prominence of the haustral folds involving the transverse colon.  There is a maximum transverse colon diameter of 10 cm involving the mid transverse colon.  No definitive evidence of pneumatosis, allowing for motion degradation.    The liver is unremarkable.  There is cholelithiasis.  There are stones within the distal CBD.  The spleen is unremarkable.  The pancreas is within normal limits.    There is nodular thickening of the adrenal glands.  The kidneys are within normal limits.  The ureters appear unremarkable.  The urinary bladder is decompressed with a Robertson catheter in place.  There is air within the urinary bladder.  There is a pessary device in place.  The adnexal structures are grossly unremarkable.    There is small amount of interloop fluid surrounding the small bowel loops in the right lower abdominal quadrant.  No definitive evidence of free air is present.    The psoas margins are within normal limits.  The abdominal wall is unremarkable.  There are advanced degenerative changes in the osseous structures.  There are postoperative changes of prior left hip arthroplasty.                               X-Ray Abdomen AP 1 View (KUB) (Final result)  Result time 05/03/18 20:12:35    Final result by Benoit Levy MD (05/03/18 20:12:35)                 Impression:      Appropriate position of enteric tube with the tip within the body of the stomach.    Persistent distended loop of large bowel in the right upper abdominal quadrant.  Toxic mesocolon is not excluded.  Continued follow-up is suggested.      Electronically signed by: Benoit Levy MD  Date:    05/03/2018  Time:    20:12             Narrative:    EXAMINATION:  XR ABDOMEN AP 1 VIEW    CLINICAL HISTORY:  Other mechanical complication of other gastrointestinal prosthetic devices, implants and grafts, initial encounter    TECHNIQUE:  Single AP View of the abdomen was  performed.    COMPARISON:  Abdomen x-ray dated 05/03/2018    FINDINGS:  Multiple monitoring EKG leads are present.  There has been interval insertion of an enteric tube with the tip within the body of the stomach.  The side port is below the GE junction.    No evidence of free air is identified on this limited examination.  There is a persistent distended loop of large bowel within the right upper abdominal quadrant measuring up to 10-11 cm.  This is incompletely visualized.  No evidence of pneumatosis is present.  No portal venous air is present.  There are degenerative changes in the osseous structures.                               X-Ray Abdomen Flat And Erect (Final result)     Abnormal  Result time 05/03/18 17:16:58    Final result by Oralia Farr MD (05/03/18 17:16:58)                 Impression:      As above.    This report was flagged in Epic as abnormal.      Electronically signed by: Oralia Farr MD  Date:    05/03/2018  Time:    17:16             Narrative:    EXAMINATION:  This report was flagged in Epic as abnormal.    XR ABDOMEN FLAT AND ERECT    CLINICAL HISTORY:  Abdominal Distention;    TECHNIQUE:  Flat and erect AP views of the abdomen were performed.    COMPARISON:  April 19, 2016    FINDINGS:  There is diffuse gaseous distension of the visualized intestinal loops with a segment of colon in the mid abdomen measuring up to 11.3 cm in diameter.  No free air is identified on the upright image.  There are degenerative changes of the spine and pubic symphysis with a lumbar levoscoliosis.  There are surgical changes in left hip.                               X-Ray Chest AP Portable (Final result)  Result time 05/03/18 17:13:13    Final result by Benoit Levy MD (05/03/18 17:13:13)                 Impression:      No acute intrathoracic process.      Electronically signed by: Benoit Levy MD  Date:    05/03/2018  Time:    17:13             Narrative:    EXAMINATION:  XR CHEST AP  PORTABLE    CLINICAL HISTORY:  Abdominal Pain;    TECHNIQUE:  Single frontal view of the chest was performed.    COMPARISON:  04/19/2016    FINDINGS:  Monitoring EKG leads are present.  The trachea is unremarkable.  There are calcifications of the aortic knob.  There are also extensive coronary artery calcifications.  There is stable prominence of the cardiomediastinal silhouette.  The hemidiaphragms are unremarkable.  There is no evidence of free air beneath the hemidiaphragms.  There are no pleural effusions.  There is no evidence of a pneumothorax.  There is no evidence of pneumomediastinum.  No airspace opacity is present.  There is stable appearance of coarse pulmonary interstitial markings.  There is subsegmental atelectasis in the left lung base.  There are degenerative changes in the osseous structures.

## 2018-05-04 NOTE — ASSESSMENT & PLAN NOTE
- last A1C:   Lab Results   Component Value Date    HGBA1C 5.6 09/14/2011    - A1C pending for AM   - hold oral antidiabetic meds   - NPO   - SSI with accuchekcs q6h

## 2018-05-04 NOTE — ASSESSMENT & PLAN NOTE
Abd CT shows copious amount of stool in large bowel with distension.  Suspect due to fecal impaction. Surgery consulted but family does not want any surgical intervention  Start PO miralax and enema TID  Prior h/o C. Diff, given possible megacolon, will check C. Diff toxin.

## 2018-05-04 NOTE — NURSING
Pt admitted to unit from ED, vitals taken, pt weak, pt very confused, oriented to self,  at bedside and historian, completed admission assessment to best ability with 's assistance, per  pt daughter to return today can provide any additional info, pt denies pain and displays no signs of pain, pt abdomen distended, fall px and delerium px maintained throughout shift, pt kept NPO per order, no bm, diarrea or n/v on shift, q6 fingersticks, bs stable, IV fluids transfusing per order,  and pt updated on plan of care, pt resting calmly throughout shift no signs of distress.

## 2018-05-04 NOTE — PROGRESS NOTES
"[facility] Hospital Medicine  Progress Note    Patient Name: Steph Poon  MRN: [id number]  Patient Class: IP- Inpatient   Admission Date: 5/3/2018  Length of Stay: 0 days  Attending Physician: Fariba Garner MD  Primary Care Provider: Stef Murry Sr, MD        Subjective:     Principal Problem:Fecal impaction    HPI:  Ms. Steph Poon is a 89 y.o. female, with history including Alzheimer's dementia and atrial fibrillation, who was brought in by spouse for concern of gradually excessive sleeping over the past six months. He states that patient was evaluated for this and informed that it was attributed to her Alzheimer's progression. Her spouse additionally reports abdominal distention which was noticed when he woke the patient up today at 10AM. He states that this is new as of today. He reports that the patient has had "loose watery" bowel movements over the 2-3 days which is also new. Spouse states that the patient has not been consuming solid foods over the past week. He states that she was previously eating scrambled eggs but has since been consuming 3-4 ensure drinks per day. He denies fever, chills, or vomiting. Spouse denies recent abx use. He also denies patient having a history of C-diff or any abdominal surgeries. Spouse reports himself as possible recent sick contact. He denies any new medications to the patient's routine meds. She sees Dr. Medrano. Patient's history and review of systems are provided by spouse/caregiver secondary to her dementia status.     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  Surgery consulted.    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 She was made NPO and started on IVF. Surgery and GI was consulted. She had incidental finding of choledocholithiasis but  was clear that he did not want any surgical interventions and wanted her to be DNR.     Interval History:  Pt seen and examined at bedside.  and daughter present at bedside.  is clear that he does not want any major surgeries done and he does not want her to be on life support. She has had decline in functional status over the past few months with sleeping more and eating less. Pt has severe dementia therefore ROS cannot be obtained.      Review of Systems   Unable to perform ROS: Dementia     Objective:     Vital Signs (Most Recent):  Temp: 98.3 °F (36.8 °C) (05/04/18 1214)  Pulse: (!) 56 (05/04/18 1214)  Resp: 16 (05/04/18 1214)  BP: 136/62 (05/04/18 1214)  SpO2: 95 % (05/04/18 1214) Vital Signs (24h Range):  Temp:  [96.9 °F (36.1 °C)-98.3 °F (36.8 °C)] 98.3 °F (36.8 °C)  Pulse:  [56-88] 56  Resp:  [16-18] 16  SpO2:  [93 %-97 %] 95 %  BP: (113-136)/(51-64) 136/62     Weight: 64.2 kg (141 lb 8.6 oz)  Body mass index is 24.29 kg/m².    Intake/Output Summary (Last 24 hours) at 05/04/18 1316  Last data filed at 05/04/18 0700   Gross per 24 hour   Intake            257.5 ml   Output                0 ml   Net            257.5 ml      Physical Exam   Constitutional: She appears well-developed and well-nourished. No distress.   Cardiovascular: Normal rate, regular rhythm, normal heart sounds and intact distal pulses.    Pulmonary/Chest: Effort normal and breath sounds normal. No respiratory distress. She has no wheezes. She has no rales.   Abdominal: She exhibits distension. She exhibits no mass. Bowel sounds are decreased. There is tenderness. There is no guarding.   Decreased bowel sounds, increased tympany.    Neurological: She is alert.   Not oriented   Skin: Skin is warm  and dry. She is not diaphoretic.   Psychiatric:   Unable to assess due to profound dementia   Nursing note and vitals reviewed.      Significant Labs:   BMP:   Recent Labs  Lab 05/04/18  0557   *      K 3.6      CO2 23   BUN 51*   CREATININE 1.1   CALCIUM 9.9   MG 2.1     CBC:   Recent Labs  Lab 05/03/18  1648 05/04/18  0557   WBC 12.81* 12.51   HGB 14.0 13.3   HCT 41.5 39.6    201     All pertinent labs within the past 24 hours have been reviewed.    Significant Imaging: I have reviewed all pertinent imaging results/findings within the past 24 hours.    Assessment/Plan:      * Fecal impaction      Abd CT shows copious amount of stool in large bowel with distension.  Suspect due to fecal impaction. Surgery consulted but family does not want any surgical intervention  Start PO miralax and enema TID  Prior h/o C. Diff, given possible megacolon, will check C. Diff toxin.          Megacolon    - NPO  - IV fluids  - PRN pain meds  - surgery consulted           Hyperglycemia    - improved after IV fluids         Choledocholithiasis    Incidental finding. LFTs WNL.  Family wants no surgical intervention        Dementia    Delirium precautions.  D/w family. They do now want any life support or heroic measures  Change code status to DNR            Hypertension    BP adequately controlled  Continue home meds        Diabetes mellitus, type 2    SSI if needed        Atrial fibrillation    Continue apixaban, carvedilol, digoxin as able          VTE Risk Mitigation     None              Fariba Garner MD  Department of Hospital Medicine   [facility]

## 2018-05-04 NOTE — HOSPITAL COURSE
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 She was made NPO and started on IVF. Surgery and GI was consulted. She had incidental finding of choledocholithiasis but  was clear that he did not want any surgical interventions and wanted her to be DNR. She had episode of rectal bleeding after an enema that was self limited and resolved. She continued to be distended so continuing with enemas and PO bowel regimen. On 5/6 she vomited and aspirated. CXR showed infiltration in R lung and she was started on empiric Unasyn. She had no improvement after days of treatment and family wants to comfort care only so the patient will be discharged to inpatient hospice at [facility].

## 2018-05-04 NOTE — ASSESSMENT & PLAN NOTE
- Negative Robledo's sign, no c/o abdominal pain   - patient's  requests no invasive procedures  - GI consulted   - monitor

## 2018-05-04 NOTE — CONSULTS
Gastroenterology Consult    5/4/2018  10:12 AM    Consulting Physician:  Ac Ricketts MD    Primary Care Provider: Stef Murry Sr, MD    Reason for consultation: Choledocholithiasis    HPI:  Steph Poon is a 89 y.o. female who presents with complaints of abdominal distension that was noted morning of presentation.  No specific triggers of the symptoms, although  reports discontinuing her stool softeners two weeks ago due to diarrhea.  She has no complaints of associated abdominal pain.  Appetite has been diminished.  She has a history of recurrent fecal impactions.  She was seen in the ED where she was noted to have a large fecal burden with distension of the transverse colon.  She was also noted to have choledocholithiasis with normal LFT's.  GI and surgery have both been consulted.  Patient's  does not want any invasive procedures performed.    Allergies:   Review of patient's allergies indicates:   Allergen Reactions    Ace inhibitors Other (See Comments)     cough    Quinine Other (See Comments)     unknown       Current Medications:    Current Facility-Administered Medications:     0.9%  NaCl infusion, , Intravenous, Continuous, Kami Duarte PA-C, Last Rate: 75 mL/hr at 05/04/18 0334    acetaminophen tablet 650 mg, 650 mg, Oral, Q4H PRN, Kami Duarte PA-C    amLODIPine tablet 5 mg, 5 mg, Oral, Daily, Kami Duarte PA-C, 5 mg at 05/04/18 0923    carvedilol tablet 6.25 mg, 6.25 mg, Oral, BID WM, Kami Duarte PA-C, 6.25 mg at 05/04/18 0924    dextrose 50% injection 12.5 g, 12.5 g, Intravenous, PRN, Kami Duarte PA-C    dextrose 50% injection 25 g, 25 g, Intravenous, PRN, Kami Duarte PA-C    digoxin tablet 125 mcg, 125 mcg, Oral, Daily, Kami Duarte PA-C, 125 mcg at 05/04/18 0924    glucagon (human recombinant) injection 1 mg, 1 mg, Intramuscular, PRN, Kami Duarte PA-C    glucose chewable tablet 16 g, 16 g, Oral, PRN,  Kami Duarte PA-C    glucose chewable tablet 24 g, 24 g, Oral, PRN, Kami Duarte PA-C    insulin aspart U-100 pen 0-5 Units, 0-5 Units, Subcutaneous, QID (AC + HS) PRN, Kami Duarte PA-C    losartan tablet 100 mg, 100 mg, Oral, Daily, Kami Duarte PA-C, 100 mg at 05/04/18 0924    morphine injection 1 mg, 1 mg, Intravenous, Q6H PRN, Kami Duarte PA-C    ondansetron disintegrating tablet 8 mg, 8 mg, Oral, Q8H PRN, Kami Duarte PA-C    ramelteon tablet 8 mg, 8 mg, Oral, Nightly PRN, Kami Duarte PA-C    rosuvastatin tablet 10 mg, 10 mg, Oral, Daily, Kami Duarte PA-C, 10 mg at 05/04/18 0924    sodium chloride 0.9% bolus 1,000 mL, 1,000 mL, Intravenous, Once, Renzo Sanabria MD    sodium chloride 0.9% flush 5 mL, 5 mL, Intravenous, PRN, Kami Duarte PA-C    vitamin D 1000 units tablet 2,000 Units, 2,000 Units, Oral, Daily, Kami Duarte PA-C, 2,000 Units at 05/04/18 0924      Social History:  Social History     Social History    Marital status:      Spouse name: N/A    Number of children: N/A    Years of education: N/A     Social History Main Topics    Smoking status: Former Smoker     Packs/day: 1.50     Years: 20.00     Types: Cigarettes     Start date: 1/1/1947     Quit date: 1/1/1967    Smokeless tobacco: Never Used    Alcohol use Yes    Drug use: No    Sexual activity: Yes     Partners: Male     Other Topics Concern    None     Social History Narrative    None       Surgical History:  Past Surgical History:   Procedure Laterality Date    cataract surgery      both eyes    HIP FRACTURE SURGERY  2011    Left hip     KNEE SURGERY      Right knee replacement    uterine sling           Family History:  Family History   Problem Relation Age of Onset    Heart disease Brother     Heart disease Mother     Heart disease Father        Review of systems:   ROS - Dementia      Physical Exam:  Temp:  [96.9 °F (36.1  °C)-97.6 °F (36.4 °C)] 97.5 °F (36.4 °C)  Pulse:  [57-88] 73  Resp:  [18] 18  SpO2:  [93 %-97 %] 95 %  BP: (113-135)/(51-64) 135/63    General: Well developed, well nourished, female in no acute distress.  Patient is alert and oriented  Eyes:  Anicteric sclera, PERRLA  ENT:  Moist mucous membranes, no drainage from ears or nose  Neck:  Supple, no nodes or masses felt, no thyromegaly  Cardiovascular:  Regular rate and rhythm without murmur  Lungs:  Clear to auscultation with normal effort; no wheezes or rales noted  GI:  Distended, bowel sounds present, mild tenderness with no rebound or guarding  Musculoskeletal:  No clubbing, cyanosis, or edema  Neurologic:  No focal defecits    Labs:  Results for RYLEY PORRAS (MRN [id number]) as of 5/4/2018 10:16   Ref. Range 5/3/2018 16:48   WBC Latest Ref Range: 3.90 - 12.70 K/uL 12.81 (H)   RBC Latest Ref Range: 4.00 - 5.40 M/uL 4.22   Hemoglobin Latest Ref Range: 12.0 - 16.0 g/dL 14.0   Hematocrit Latest Ref Range: 37.0 - 48.5 % 41.5   MCV Latest Ref Range: 82 - 98 fL 98   MCH Latest Ref Range: 27.0 - 31.0 pg 33.2 (H)   MCHC Latest Ref Range: 32.0 - 36.0 g/dL 33.7   RDW Latest Ref Range: 11.5 - 14.5 % 13.0   Platelets Latest Ref Range: 150 - 350 K/uL 235   MPV Latest Ref Range: 9.2 - 12.9 fL 11.4   Gran% Latest Ref Range: 38.0 - 73.0 % 79.4 (H)   Gran # (ANC) Latest Ref Range: 1.8 - 7.7 K/uL 10.2 (H)   Lymph% Latest Ref Range: 18.0 - 48.0 % 13.7 (L)   Lymph # Latest Ref Range: 1.0 - 4.8 K/uL 1.8   Mono% Latest Ref Range: 4.0 - 15.0 % 5.4   Mono # Latest Ref Range: 0.3 - 1.0 K/uL 0.7   Eosinophil% Latest Ref Range: 0.0 - 8.0 % 1.1   Eos # Latest Ref Range: 0.0 - 0.5 K/uL 0.1   Basophil% Latest Ref Range: 0.0 - 1.9 % 0.1   Baso # Latest Ref Range: 0.00 - 0.20 K/uL 0.01   Protime Latest Ref Range: 9.0 - 12.5 sec 11.1   Coumadin Monitoring INR Latest Ref Range: 0.8 - 1.2  1.0   Sodium Latest Ref Range: 136 - 145 mmol/L 140   Potassium Latest Ref Range: 3.5 - 5.1 mmol/L 4.2  "  Chloride Latest Ref Range: 95 - 110 mmol/L 103   CO2 Latest Ref Range: 23 - 29 mmol/L 24   Anion Gap Latest Ref Range: 8 - 16 mmol/L 13   BUN, Bld Latest Ref Range: 10 - 30 mg/dL 55 (H)   Creatinine Latest Ref Range: 0.5 - 1.4 mg/dL 1.5 (H)   eGFR if non African American Latest Ref Range: >60 mL/min/1.73 m^2 30 (A)   eGFR if African American Latest Ref Range: >60 mL/min/1.73 m^2 35 (A)   Glucose Latest Ref Range: 70 - 110 mg/dL 329 (H)   Calcium Latest Ref Range: 8.7 - 10.5 mg/dL 10.8 (H)   Alkaline Phosphatase Latest Ref Range: 55 - 135 U/L 65   Total Protein Latest Ref Range: 6.0 - 8.4 g/dL 7.1   Albumin Latest Ref Range: 3.5 - 5.2 g/dL 3.4 (L)   Total Bilirubin Latest Ref Range: 0.1 - 1.0 mg/dL 0.9   AST Latest Ref Range: 10 - 40 U/L 15   ALT Latest Ref Range: 10 - 44 U/L 11   Lipase Latest Ref Range: 4 - 60 U/L 35       Imaging and Other Studies:  CT Abdomen Pelvis  Without Contrast   Impression     Large copious amount of stool within the large bowel with distention of the large bowel and prominence of the haustral folds.  The maximum diameter of the transverse colon is 10 cm.  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  Surgical consultation is recommended.    Wall thickening of the small bowel loops with interloop fluid within the small bowel loops in the right lower abdominal quadrant.  The findings represent nonspecific enteritis.  Early ischemia is not entirely excluded.    Diverticulosis coli without evidence of acute diverticulitis.    Cholelithiasis and choledocholithiasis.         Assessment:  1.  Fecal impaction with abdominal/transverse colon distension  2.  Choledocholithiasis with normal LFT"s    Plan:  1.  Recommend treating fecal impaction with series of enemas  2.  No invasive procedures  3.  Daily Miralax, titrated to effect.    Ac Ricketts    "

## 2018-05-04 NOTE — ASSESSMENT & PLAN NOTE
Delirium precautions.  D/w family. They do now want any life support or heroic measures  Change code status to DNR

## 2018-05-04 NOTE — PLAN OF CARE
LMSW met with patient at the bedside to complete discharge planning assessment. Patients  Carlos at the bedside and completed assessment.     Patient is alert and disoriented.      Patients PCP is Dr. Stef Murry. Patients prefers to use [facility] on [address]      Prior to admission patient was able to take some steps but required assistance with all ADL's. Carlos and their 3 daughters take care of the patient and will continue. Patient is not currently using HH. Patient has a walker and wheelchair.     Patient will be transported home by family pending discharge.      SW will continue to follow no needs identified at this time.        05/04/18 1126   Discharge Assessment   Assessment Type Discharge Planning Assessment   Confirmed/corrected address and phone number on facesheet? Yes   Assessment information obtained from? Patient;Caregiver   Communicated expected length of stay with patient/caregiver no   Prior to hospitilization cognitive status: Not Oriented to Place;Not Oriented to Time   Prior to hospitalization functional status: Partially Dependent   Current cognitive status: Not Oriented to Time;Not Oriented to Place   Current Functional Status: Partially Dependent   Lives With spouse   Able to Return to Prior Arrangements yes   Is patient able to care for self after discharge? No   Patient's perception of discharge disposition home or selfcare   Readmission Within The Last 30 Days no previous admission in last 30 days   Patient currently being followed by outpatient case management? No   Patient currently receives any other outside agency services? No   Equipment Currently Used at Home walker, rolling;wheelchair   Do you have any problems affording any of your prescribed medications? No   Is the patient taking medications as prescribed? yes   Does the patient have transportation home? Yes   Transportation Available family or friend will provide   Does the patient receive services at the [facility]? No   Discharge Plan A Home with family   Discharge Plan B Home Health   Patient/Family In Agreement With Plan yes   Does the patient have transportation to healthcare appointments? Yes

## 2018-05-04 NOTE — HPI
"Ms. Steph Poon is a 89 y.o. female, with history including Alzheimer's dementia and atrial fibrillation, who was brought in by spouse for concern of gradually excessive sleeping over the past six months. He states that patient was evaluated for this and informed that it was attributed to her Alzheimer's progression. Her spouse additionally reports abdominal distention which was noticed when he woke the patient up today at 10AM. He states that this is new as of today. He reports that the patient has had "loose watery" bowel movements over the 2-3 days which is also new. Spouse states that the patient has not been consuming solid foods over the past week. He states that she was previously eating scrambled eggs but has since been consuming 3-4 ensure drinks per day. He denies fever, chills, or vomiting. Spouse denies recent abx use. He also denies patient having a history of C-diff or any abdominal surgeries. Spouse reports himself as possible recent sick contact. He denies any new medications to the patient's routine meds. She sees Dr. Medrano. Patient's history and review of systems are provided by spouse/caregiver secondary to her dementia status.     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  Surgery consulted.  "

## 2018-05-04 NOTE — SUBJECTIVE & OBJECTIVE
Interval History:  Pt seen and examined at bedside.  and daughter present at bedside.  is clear that he does not want any major surgeries done and he does not want her to be on life support. She has had decline in functional status over the past few months with sleeping more and eating less. Pt has severe dementia therefore ROS cannot be obtained.      Review of Systems   Unable to perform ROS: Dementia     Objective:     Vital Signs (Most Recent):  Temp: 98.3 °F (36.8 °C) (05/04/18 1214)  Pulse: (!) 56 (05/04/18 1214)  Resp: 16 (05/04/18 1214)  BP: 136/62 (05/04/18 1214)  SpO2: 95 % (05/04/18 1214) Vital Signs (24h Range):  Temp:  [96.9 °F (36.1 °C)-98.3 °F (36.8 °C)] 98.3 °F (36.8 °C)  Pulse:  [56-88] 56  Resp:  [16-18] 16  SpO2:  [93 %-97 %] 95 %  BP: (113-136)/(51-64) 136/62     Weight: 64.2 kg (141 lb 8.6 oz)  Body mass index is 24.29 kg/m².    Intake/Output Summary (Last 24 hours) at 05/04/18 1316  Last data filed at 05/04/18 0700   Gross per 24 hour   Intake            257.5 ml   Output                0 ml   Net            257.5 ml      Physical Exam   Constitutional: She appears well-developed and well-nourished. No distress.   Cardiovascular: Normal rate, regular rhythm, normal heart sounds and intact distal pulses.    Pulmonary/Chest: Effort normal and breath sounds normal. No respiratory distress. She has no wheezes. She has no rales.   Abdominal: She exhibits distension. She exhibits no mass. Bowel sounds are decreased. There is tenderness. There is no guarding.   Decreased bowel sounds, increased tympany.    Neurological: She is alert.   Not oriented   Skin: Skin is warm and dry. She is not diaphoretic.   Psychiatric:   Unable to assess due to profound dementia   Nursing note and vitals reviewed.      Significant Labs:   BMP:   Recent Labs  Lab 05/04/18  0557   *      K 3.6      CO2 23   BUN 51*   CREATININE 1.1   CALCIUM 9.9   MG 2.1     CBC:   Recent Labs  Lab  05/03/18  1648 05/04/18  0557   WBC 12.81* 12.51   HGB 14.0 13.3   HCT 41.5 39.6    201     All pertinent labs within the past 24 hours have been reviewed.    Significant Imaging: I have reviewed all pertinent imaging results/findings within the past 24 hours.

## 2018-05-04 NOTE — PROGRESS NOTES
Pt known to us from clinic and followed by Dr. Jeanmarie Mccabe.   called our office yesterday with issues concerning his wife.  Admitted overnight for Megacolon and large fecal impaction.     stopped me in hallway to discuss case.      Will follow from a distance for continuity of care.    Call with questions.

## 2018-05-04 NOTE — CONSULTS
89yoWF with constipation and gallstones. Dementia.  Abd distended, some tenderness.  Family does want surgery of any kind! Agree with their decision.  Enemas to help clear impaction.  Comfort care.

## 2018-05-05 PROBLEM — K62.5 RECTAL BLEEDING: Status: ACTIVE | Noted: 2018-05-05

## 2018-05-05 LAB
ANION GAP SERPL CALC-SCNC: 14 MMOL/L
BASOPHILS NFR BLD: 0 %
BUN SERPL-MCNC: 42 MG/DL
CALCIUM SERPL-MCNC: 9.4 MG/DL
CHLORIDE SERPL-SCNC: 108 MMOL/L
CO2 SERPL-SCNC: 20 MMOL/L
CREAT SERPL-MCNC: 0.9 MG/DL
DIFFERENTIAL METHOD: ABNORMAL
EOSINOPHIL NFR BLD: 0 %
ERYTHROCYTE [DISTWIDTH] IN BLOOD BY AUTOMATED COUNT: 12.8 %
EST. GFR  (AFRICAN AMERICAN): >60 ML/MIN/1.73 M^2
EST. GFR  (NON AFRICAN AMERICAN): 56 ML/MIN/1.73 M^2
GLUCOSE SERPL-MCNC: 205 MG/DL
HCT VFR BLD AUTO: 40.5 %
HGB BLD-MCNC: 13.4 G/DL
LYMPHOCYTES NFR BLD: 100 %
MAGNESIUM SERPL-MCNC: 2.2 MG/DL
MCH RBC QN AUTO: 32.4 PG
MCHC RBC AUTO-ENTMCNC: 33.1 G/DL
MCV RBC AUTO: 98 FL
MONOCYTES NFR BLD: 0 %
NEUTROPHILS NFR BLD: 0 %
PHOSPHATE SERPL-MCNC: 2.8 MG/DL
PLATELET # BLD AUTO: 233 K/UL
PMV BLD AUTO: 11.8 FL
POCT GLUCOSE: 157 MG/DL (ref 70–110)
POCT GLUCOSE: 196 MG/DL (ref 70–110)
POCT GLUCOSE: 202 MG/DL (ref 70–110)
POCT GLUCOSE: 212 MG/DL (ref 70–110)
POCT GLUCOSE: 233 MG/DL (ref 70–110)
POTASSIUM SERPL-SCNC: 3.3 MMOL/L
RBC # BLD AUTO: 4.13 M/UL
SODIUM SERPL-SCNC: 142 MMOL/L
WBC # BLD AUTO: 14.04 K/UL

## 2018-05-05 PROCEDURE — 80048 BASIC METABOLIC PNL TOTAL CA: CPT

## 2018-05-05 PROCEDURE — 99233 SBSQ HOSP IP/OBS HIGH 50: CPT | Mod: ,,, | Performed by: INTERNAL MEDICINE

## 2018-05-05 PROCEDURE — 25000003 PHARM REV CODE 250: Performed by: PHYSICIAN ASSISTANT

## 2018-05-05 PROCEDURE — 99900035 HC TECH TIME PER 15 MIN (STAT)

## 2018-05-05 PROCEDURE — 11000001 HC ACUTE MED/SURG PRIVATE ROOM

## 2018-05-05 PROCEDURE — 36415 COLL VENOUS BLD VENIPUNCTURE: CPT

## 2018-05-05 PROCEDURE — 85025 COMPLETE CBC W/AUTO DIFF WBC: CPT

## 2018-05-05 PROCEDURE — 25000003 PHARM REV CODE 250: Performed by: INTERNAL MEDICINE

## 2018-05-05 PROCEDURE — 63600175 PHARM REV CODE 636 W HCPCS: Performed by: PHYSICIAN ASSISTANT

## 2018-05-05 PROCEDURE — 83735 ASSAY OF MAGNESIUM: CPT

## 2018-05-05 PROCEDURE — 94761 N-INVAS EAR/PLS OXIMETRY MLT: CPT

## 2018-05-05 PROCEDURE — 84100 ASSAY OF PHOSPHORUS: CPT

## 2018-05-05 RX ORDER — LORAZEPAM 0.5 MG/1
0.5 TABLET ORAL EVERY 8 HOURS PRN
Status: DISCONTINUED | OUTPATIENT
Start: 2018-05-05 | End: 2018-05-07

## 2018-05-05 RX ORDER — HYDROCORTISONE 25 MG/G
CREAM TOPICAL 2 TIMES DAILY
Status: DISCONTINUED | OUTPATIENT
Start: 2018-05-05 | End: 2018-05-07

## 2018-05-05 RX ADMIN — SODIUM CHLORIDE: 0.9 INJECTION, SOLUTION INTRAVENOUS at 07:05

## 2018-05-05 RX ADMIN — SODIUM PHOSPHATE, DIBASIC AND SODIUM PHOSPHATE, MONOBASIC 1 ENEMA: 7; 19 ENEMA RECTAL at 12:05

## 2018-05-05 RX ADMIN — POLYETHYLENE GLYCOL 3350 17 G: 17 POWDER, FOR SOLUTION ORAL at 09:05

## 2018-05-05 RX ADMIN — POLYETHYLENE GLYCOL 3350 17 G: 17 POWDER, FOR SOLUTION ORAL at 08:05

## 2018-05-05 RX ADMIN — INSULIN ASPART 1 UNITS: 100 INJECTION, SOLUTION INTRAVENOUS; SUBCUTANEOUS at 06:05

## 2018-05-05 RX ADMIN — DIGOXIN 125 MCG: 125 TABLET ORAL at 08:05

## 2018-05-05 RX ADMIN — SODIUM CHLORIDE: 0.9 INJECTION, SOLUTION INTRAVENOUS at 05:05

## 2018-05-05 RX ADMIN — LOSARTAN POTASSIUM 100 MG: 50 TABLET, FILM COATED ORAL at 08:05

## 2018-05-05 RX ADMIN — VITAMIN D, TAB 1000IU (100/BT) 2000 UNITS: 25 TAB at 08:05

## 2018-05-05 RX ADMIN — CARVEDILOL 6.25 MG: 12.5 TABLET, FILM COATED ORAL at 08:05

## 2018-05-05 RX ADMIN — CARVEDILOL 6.25 MG: 12.5 TABLET, FILM COATED ORAL at 04:05

## 2018-05-05 RX ADMIN — AMLODIPINE BESYLATE 5 MG: 5 TABLET ORAL at 08:05

## 2018-05-05 RX ADMIN — HYDROCORTISONE 2.5%: 25 CREAM TOPICAL at 12:05

## 2018-05-05 RX ADMIN — ROSUVASTATIN CALCIUM 10 MG: 10 TABLET, FILM COATED ORAL at 08:05

## 2018-05-05 RX ADMIN — HYDROCORTISONE 2.5%: 25 CREAM TOPICAL at 09:05

## 2018-05-05 NOTE — PROGRESS NOTES
Enema was given per MD order. A small amount of soft brown stool noted. A moderate amount of bright red blood noted on enema tubing. Kami Duarte PA-C notified. No new orders received at this time. Will continue to monitor patient.

## 2018-05-05 NOTE — SUBJECTIVE & OBJECTIVE
Interval History:  Pt seen and examined at bedside. D/w RN.  and daughter present. Updated on POC and questions answered. Pt had some rectal bleeding this morning after enema. Daughter reports she gets very anxious with receiving enemas. Tolerating some liquids.      Review of Systems   Unable to perform ROS: Dementia     Objective:     Vital Signs (Most Recent):  Temp: 98.1 °F (36.7 °C) (05/05/18 1112)  Pulse: 97 (05/05/18 1112)  Resp: 18 (05/05/18 1112)  BP: (!) 130/58 (05/05/18 1112)  SpO2: 98 % (05/05/18 1112) Vital Signs (24h Range):  Temp:  [97.1 °F (36.2 °C)-98.6 °F (37 °C)] 98.1 °F (36.7 °C)  Pulse:  [] 97  Resp:  [16-20] 18  SpO2:  [95 %-99 %] 98 %  BP: (130-184)/(58-81) 130/58     Weight: 64.2 kg (141 lb 8.6 oz)  Body mass index is 24.29 kg/m².    Intake/Output Summary (Last 24 hours) at 05/05/18 1128  Last data filed at 05/05/18 0600   Gross per 24 hour   Intake                0 ml   Output                0 ml   Net                0 ml      Physical Exam   Constitutional: She appears well-developed and well-nourished. No distress.   Cardiovascular: Normal rate, regular rhythm, normal heart sounds and intact distal pulses.    Pulmonary/Chest: Effort normal and breath sounds normal. No respiratory distress. She has no wheezes. She has no rales.   Abdominal: She exhibits distension. She exhibits no mass. Bowel sounds are decreased. There is tenderness. There is no guarding.   Decreased bowel sounds, increased tympany.    Neurological: She is alert.   Not oriented   Skin: Skin is warm and dry. She is not diaphoretic.   Psychiatric:   Unable to assess due to profound dementia   Nursing note and vitals reviewed.      Significant Labs:   BMP:   Recent Labs  Lab 05/05/18  0544   *      K 3.3*      CO2 20*   BUN 42*   CREATININE 0.9   CALCIUM 9.4   MG 2.2     CBC:   Recent Labs  Lab 05/03/18  1648 05/04/18  0557 05/05/18  0544   WBC 12.81* 12.51 14.04*   HGB 14.0 13.3 13.4   HCT  41.5 39.6 40.5    201 233     All pertinent labs within the past 24 hours have been reviewed.    Significant Imaging: I have reviewed all pertinent imaging results/findings within the past 24 hours.

## 2018-05-05 NOTE — PROGRESS NOTES
"[facility] Hospital Medicine  Progress Note    Patient Name: Steph Poon  MRN: [id number]  Patient Class: IP- Inpatient   Admission Date: 5/3/2018  Length of Stay: 1 days  Attending Physician: Fariba Garner MD  Primary Care Provider: Stef Murry Sr, MD        Subjective:     Principal Problem:Fecal impaction    HPI:  Ms. Steph Poon is a 89 y.o. female, with history including Alzheimer's dementia and atrial fibrillation, who was brought in by spouse for concern of gradually excessive sleeping over the past six months. He states that patient was evaluated for this and informed that it was attributed to her Alzheimer's progression. Her spouse additionally reports abdominal distention which was noticed when he woke the patient up today at 10AM. He states that this is new as of today. He reports that the patient has had "loose watery" bowel movements over the 2-3 days which is also new. Spouse states that the patient has not been consuming solid foods over the past week. He states that she was previously eating scrambled eggs but has since been consuming 3-4 ensure drinks per day. He denies fever, chills, or vomiting. Spouse denies recent abx use. He also denies patient having a history of C-diff or any abdominal surgeries. Spouse reports himself as possible recent sick contact. He denies any new medications to the patient's routine meds. She sees Dr. Medrano. Patient's history and review of systems are provided by spouse/caregiver secondary to her dementia status.     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  Surgery consulted.    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 She was made NPO and started on IVF. Surgery and GI was consulted. She had incidental finding of choledocholithiasis but  was clear that he did not want any surgical interventions and wanted her to be DNR. She had episode of rectal bleeding after an enema that was self limited and resolved. She continued to be distended so continuing with enemas and PO bowel regimen    Interval History:  Pt seen and examined at bedside. D/w RN.  and daughter present. Updated on POC and questions answered. Pt had some rectal bleeding this morning after enema. Daughter reports she gets very anxious with receiving enemas. Tolerating some liquids.      Review of Systems   Unable to perform ROS: Dementia     Objective:     Vital Signs (Most Recent):  Temp: 98.1 °F (36.7 °C) (05/05/18 1112)  Pulse: 97 (05/05/18 1112)  Resp: 18 (05/05/18 1112)  BP: (!) 130/58 (05/05/18 1112)  SpO2: 98 % (05/05/18 1112) Vital Signs (24h Range):  Temp:  [97.1 °F (36.2 °C)-98.6 °F (37 °C)] 98.1 °F (36.7 °C)  Pulse:  [] 97  Resp:  [16-20] 18  SpO2:  [95 %-99 %] 98 %  BP: (130-184)/(58-81) 130/58     Weight: 64.2 kg (141 lb 8.6 oz)  Body mass index is 24.29 kg/m².    Intake/Output Summary (Last 24 hours) at 05/05/18 1128  Last data filed at 05/05/18 0600   Gross per 24 hour   Intake                0 ml   Output                0 ml   Net                0 ml      Physical Exam   Constitutional: She appears well-developed and well-nourished. No distress.   Cardiovascular: Normal rate, regular rhythm, normal heart sounds and intact distal pulses.    Pulmonary/Chest: Effort normal and breath sounds normal. No respiratory distress. She has no wheezes. She has no rales.   Abdominal: She exhibits distension. She exhibits no mass. Bowel sounds are decreased. There is tenderness. There is no guarding.   Decreased bowel sounds, increased tympany.     Neurological: She is alert.   Not oriented   Skin: Skin is warm and dry. She is not diaphoretic.   Psychiatric:   Unable to assess due to profound dementia   Nursing note and vitals reviewed.      Significant Labs:   BMP:   Recent Labs  Lab 05/05/18  0544   *      K 3.3*      CO2 20*   BUN 42*   CREATININE 0.9   CALCIUM 9.4   MG 2.2     CBC:   Recent Labs  Lab 05/03/18  1648 05/04/18  0557 05/05/18  0544   WBC 12.81* 12.51 14.04*   HGB 14.0 13.3 13.4   HCT 41.5 39.6 40.5    201 233     All pertinent labs within the past 24 hours have been reviewed.    Significant Imaging: I have reviewed all pertinent imaging results/findings within the past 24 hours.    Assessment/Plan:      * Fecal impaction      Abd CT shows copious amount of stool in large bowel with distension.  Suspect due to fecal impaction. Surgery consulted but family does not want any surgical intervention  Continue miralax and enema TID. Trial of fleets enema today.   Low dose ativan prior to receiving enema            Rectal bleeding    Likely due to trauma from enema. Also with external hemorrhoids  Hold eliquis  Anusol cream BID          Megacolon    - NPO  - IV fluids  - PRN pain meds  - surgery consulted           Hyperglycemia    - improved after IV fluids         Choledocholithiasis    Incidental finding. LFTs WNL.  Family wants no surgical intervention        Dementia    Delirium precautions.  D/w family. They do now want any life support or heroic measures  Change code status to DNR            Hypertension    BP adequately controlled  Continue home meds        Diabetes mellitus, type 2    Continue SSI        Atrial fibrillation    Continue carvedilol, digoxin   Hold eliquis 2/2 rectal bleeding          VTE Risk Mitigation     None        Patient is high risk due to:  - Acute or chronic illnesses or injuries that pose a threat to life or bodily function        Fariba Garner MD  Department of Hospital Medicine    [facility]

## 2018-05-05 NOTE — ASSESSMENT & PLAN NOTE
Abd CT shows copious amount of stool in large bowel with distension.  Suspect due to fecal impaction. Surgery consulted but family does not want any surgical intervention  Continue miralax and enema TID. Trial of fleets enema today.   Low dose ativan prior to receiving enema

## 2018-05-05 NOTE — NURSING
Very small Bm with fleets patient abd very distend.appear larger today.MD aware of distention.no real results from enemas.

## 2018-05-06 LAB
ANION GAP SERPL CALC-SCNC: 11 MMOL/L
BASOPHILS # BLD AUTO: 0.02 K/UL
BASOPHILS NFR BLD: 0.2 %
BUN SERPL-MCNC: 38 MG/DL
CALCIUM SERPL-MCNC: 9.3 MG/DL
CHLORIDE SERPL-SCNC: 111 MMOL/L
CO2 SERPL-SCNC: 21 MMOL/L
CREAT SERPL-MCNC: 1 MG/DL
DIFFERENTIAL METHOD: ABNORMAL
EOSINOPHIL # BLD AUTO: 0 K/UL
EOSINOPHIL NFR BLD: 0.2 %
ERYTHROCYTE [DISTWIDTH] IN BLOOD BY AUTOMATED COUNT: 13 %
EST. GFR  (AFRICAN AMERICAN): 57 ML/MIN/1.73 M^2
EST. GFR  (NON AFRICAN AMERICAN): 49 ML/MIN/1.73 M^2
GLUCOSE SERPL-MCNC: 212 MG/DL
HCT VFR BLD AUTO: 38.6 %
HGB BLD-MCNC: 13 G/DL
LYMPHOCYTES # BLD AUTO: 1.1 K/UL
LYMPHOCYTES NFR BLD: 8.4 %
MCH RBC QN AUTO: 33.2 PG
MCHC RBC AUTO-ENTMCNC: 33.7 G/DL
MCV RBC AUTO: 99 FL
MONOCYTES # BLD AUTO: 0.9 K/UL
MONOCYTES NFR BLD: 6.8 %
NEUTROPHILS # BLD AUTO: 11.2 K/UL
NEUTROPHILS NFR BLD: 84 %
PLATELET # BLD AUTO: 242 K/UL
PMV BLD AUTO: 11.8 FL
POCT GLUCOSE: 204 MG/DL (ref 70–110)
POCT GLUCOSE: 211 MG/DL (ref 70–110)
POCT GLUCOSE: 222 MG/DL (ref 70–110)
POCT GLUCOSE: 224 MG/DL (ref 70–110)
POTASSIUM SERPL-SCNC: 3.2 MMOL/L
RBC # BLD AUTO: 3.91 M/UL
SODIUM SERPL-SCNC: 143 MMOL/L
WBC # BLD AUTO: 13.27 K/UL

## 2018-05-06 PROCEDURE — 25000003 PHARM REV CODE 250: Performed by: INTERNAL MEDICINE

## 2018-05-06 PROCEDURE — 63600175 PHARM REV CODE 636 W HCPCS: Performed by: PHYSICIAN ASSISTANT

## 2018-05-06 PROCEDURE — 11000001 HC ACUTE MED/SURG PRIVATE ROOM

## 2018-05-06 PROCEDURE — 63600175 PHARM REV CODE 636 W HCPCS: Performed by: INTERNAL MEDICINE

## 2018-05-06 PROCEDURE — 25000003 PHARM REV CODE 250: Performed by: PHYSICIAN ASSISTANT

## 2018-05-06 PROCEDURE — 94761 N-INVAS EAR/PLS OXIMETRY MLT: CPT

## 2018-05-06 PROCEDURE — 99233 SBSQ HOSP IP/OBS HIGH 50: CPT | Mod: ,,, | Performed by: INTERNAL MEDICINE

## 2018-05-06 PROCEDURE — 85025 COMPLETE CBC W/AUTO DIFF WBC: CPT

## 2018-05-06 PROCEDURE — 80048 BASIC METABOLIC PNL TOTAL CA: CPT

## 2018-05-06 PROCEDURE — 36415 COLL VENOUS BLD VENIPUNCTURE: CPT

## 2018-05-06 RX ORDER — ADHESIVE BANDAGE
30 BANDAGE TOPICAL DAILY PRN
Status: DISCONTINUED | OUTPATIENT
Start: 2018-05-06 | End: 2018-05-07

## 2018-05-06 RX ORDER — POTASSIUM CHLORIDE 20 MEQ/1
40 TABLET, EXTENDED RELEASE ORAL ONCE
Status: DISCONTINUED | OUTPATIENT
Start: 2018-05-06 | End: 2018-05-06

## 2018-05-06 RX ORDER — POTASSIUM CHLORIDE 20 MEQ/15ML
40 SOLUTION ORAL ONCE
Status: COMPLETED | OUTPATIENT
Start: 2018-05-06 | End: 2018-05-06

## 2018-05-06 RX ADMIN — ROSUVASTATIN CALCIUM 10 MG: 10 TABLET, FILM COATED ORAL at 08:05

## 2018-05-06 RX ADMIN — INSULIN ASPART 1 UNITS: 100 INJECTION, SOLUTION INTRAVENOUS; SUBCUTANEOUS at 06:05

## 2018-05-06 RX ADMIN — INSULIN ASPART 1 UNITS: 100 INJECTION, SOLUTION INTRAVENOUS; SUBCUTANEOUS at 12:05

## 2018-05-06 RX ADMIN — HYDROCORTISONE 2.5%: 25 CREAM TOPICAL at 11:05

## 2018-05-06 RX ADMIN — DIGOXIN 125 MCG: 125 TABLET ORAL at 08:05

## 2018-05-06 RX ADMIN — HYDROCORTISONE 2.5%: 25 CREAM TOPICAL at 08:05

## 2018-05-06 RX ADMIN — AMLODIPINE BESYLATE 5 MG: 5 TABLET ORAL at 08:05

## 2018-05-06 RX ADMIN — VITAMIN D, TAB 1000IU (100/BT) 2000 UNITS: 25 TAB at 08:05

## 2018-05-06 RX ADMIN — CARVEDILOL 6.25 MG: 12.5 TABLET, FILM COATED ORAL at 08:05

## 2018-05-06 RX ADMIN — LOSARTAN POTASSIUM 100 MG: 50 TABLET, FILM COATED ORAL at 08:05

## 2018-05-06 RX ADMIN — LORAZEPAM 0.5 MG: 0.5 TABLET ORAL at 10:05

## 2018-05-06 RX ADMIN — AMPICILLIN AND SULBACTAM 1.5 G: 1; .5 INJECTION, POWDER, FOR SOLUTION INTRAVENOUS at 11:05

## 2018-05-06 RX ADMIN — SODIUM CHLORIDE: 0.9 INJECTION, SOLUTION INTRAVENOUS at 07:05

## 2018-05-06 RX ADMIN — POTASSIUM CHLORIDE 40 MEQ: 40 SOLUTION ORAL at 11:05

## 2018-05-06 RX ADMIN — AMPICILLIN AND SULBACTAM 1.5 G: 1; .5 INJECTION, POWDER, FOR SOLUTION INTRAVENOUS at 06:05

## 2018-05-06 RX ADMIN — INSULIN ASPART 2 UNITS: 100 INJECTION, SOLUTION INTRAVENOUS; SUBCUTANEOUS at 08:05

## 2018-05-06 RX ADMIN — LORAZEPAM 0.5 MG: 0.5 TABLET ORAL at 04:05

## 2018-05-06 RX ADMIN — POLYETHYLENE GLYCOL 3350 17 G: 17 POWDER, FOR SOLUTION ORAL at 08:05

## 2018-05-06 NOTE — ASSESSMENT & PLAN NOTE
Resolved. Likely due to trauma from enema. Also with external hemorrhoids  Hold eliquis  Anusol cream BID

## 2018-05-06 NOTE — ASSESSMENT & PLAN NOTE
Abd CT shows copious amount of stool in large bowel with distension.  Suspect due to fecal impaction. Surgery consulted but family does not want any surgical intervention  Continue miralax and enema TID. Fleet enema today. Add MOM  Low dose ativan prior to receiving enema

## 2018-05-06 NOTE — PROGRESS NOTES
"[facility] Hospital Medicine  Progress Note    Patient Name: Steph Poon  MRN: [id number]  Patient Class: IP- Inpatient   Admission Date: 5/3/2018  Length of Stay: 2 days  Attending Physician: Fariba Garner MD  Primary Care Provider: Stef Murry Sr, MD        Subjective:     Principal Problem:Fecal impaction    HPI:  Ms. Steph Poon is a 89 y.o. female, with history including Alzheimer's dementia and atrial fibrillation, who was brought in by spouse for concern of gradually excessive sleeping over the past six months. He states that patient was evaluated for this and informed that it was attributed to her Alzheimer's progression. Her spouse additionally reports abdominal distention which was noticed when he woke the patient up today at 10AM. He states that this is new as of today. He reports that the patient has had "loose watery" bowel movements over the 2-3 days which is also new. Spouse states that the patient has not been consuming solid foods over the past week. He states that she was previously eating scrambled eggs but has since been consuming 3-4 ensure drinks per day. He denies fever, chills, or vomiting. Spouse denies recent abx use. He also denies patient having a history of C-diff or any abdominal surgeries. Spouse reports himself as possible recent sick contact. He denies any new medications to the patient's routine meds. She sees Dr. Medrano. Patient's history and review of systems are provided by spouse/caregiver secondary to her dementia status.     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  Surgery consulted.    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 She was made NPO and started on IVF. Surgery and GI was consulted. She had incidental finding of choledocholithiasis but  was clear that he did not want any surgical interventions and wanted her to be DNR. She had episode of rectal bleeding after an enema that was self limited and resolved. She continued to be distended so continuing with enemas and PO bowel regimen    Interval History:  Pt seen and examined at bedside. D/w RN.  and daughter present. Updated on POC and questions answered. She is tolerating some liquids. No BM so despite enemas.       Review of Systems   Unable to perform ROS: Dementia     Objective:     Vital Signs (Most Recent):  Temp: 98.4 °F (36.9 °C) (05/06/18 0813)  Pulse: 79 (05/06/18 0813)  Resp: 18 (05/06/18 0813)  BP: 133/63 (05/06/18 0813)  SpO2: 96 % (05/06/18 0821) Vital Signs (24h Range):  Temp:  [97.4 °F (36.3 °C)-98.9 °F (37.2 °C)] 98.4 °F (36.9 °C)  Pulse:  [75-97] 79  Resp:  [16-20] 18  SpO2:  [92 %-99 %] 96 %  BP: (133-173)/(63-73) 133/63     Weight: 64.2 kg (141 lb 8.6 oz)  Body mass index is 24.29 kg/m².    Intake/Output Summary (Last 24 hours) at 05/06/18 1138  Last data filed at 05/06/18 0645   Gross per 24 hour   Intake          1439.17 ml   Output                0 ml   Net          1439.17 ml      Physical Exam   Constitutional: She appears well-developed and well-nourished. No distress.   Cardiovascular: Normal rate, regular rhythm, normal heart sounds and intact distal pulses.    Pulmonary/Chest: Effort normal and breath sounds normal. No respiratory distress. She has no wheezes. She has no rales.   Abdominal: She exhibits distension. There is tenderness.   Hyperactive bowel sounds   Neurological: She is alert.   Not oriented   Skin: Skin is warm and dry. She is not diaphoretic.   Psychiatric:   Unable to assess due to profound dementia   Nursing note and  vitals reviewed.      Significant Labs:   BMP:     Recent Labs  Lab 05/05/18  0544 05/06/18  0804   * 212*    143   K 3.3* 3.2*    111*   CO2 20* 21*   BUN 42* 38*   CREATININE 0.9 1.0   CALCIUM 9.4 9.3   MG 2.2  --      CBC:     Recent Labs  Lab 05/05/18  0544 05/06/18  0804   WBC 14.04* 13.27*   HGB 13.4 13.0   HCT 40.5 38.6    242     All pertinent labs within the past 24 hours have been reviewed.    Significant Imaging: I have reviewed all pertinent imaging results/findings within the past 24 hours.    Assessment/Plan:      * Fecal impaction      Abd CT shows copious amount of stool in large bowel with distension.  Suspect due to fecal impaction. Surgery consulted but family does not want any surgical intervention  Continue miralax and enema TID. Fleet enema today. Add MOM  Low dose ativan prior to receiving enema            Rectal bleeding    Resolved. Likely due to trauma from enema. Also with external hemorrhoids  Hold eliquis  Anusol cream BID          Megacolon    - NPO  - IV fluids  - PRN pain meds  - surgery consulted           Hyperglycemia    - improved after IV fluids         Choledocholithiasis    Incidental finding. LFTs WNL.  Family wants no surgical intervention        Dementia    Delirium precautions.  D/w family. They do now want any life support or heroic measures  Change code status to DNR            Hypertension    BP adequately controlled  Continue home meds        Diabetes mellitus, type 2    Continue SSI        Atrial fibrillation    Continue carvedilol, digoxin   Hold eliquis 2/2 rectal bleeding          VTE Risk Mitigation     None          Patient is high risk due to:  - Acute or chronic illnesses or injuries that pose a threat to life or bodily function    Addendum: 1:04 pm  Called by RN, suspect patient may have aspirated while receiving PO KCl solution, vomited after receiving. Daughter also reports she vomited last night. Now with b/l rhonchi and decreased  bowel sounds. Discussed with RN and family. Agreeable to NPO and NGT. Oxygen sats stable. Check CXR    Fariba Garner MD  Department of Hospital Medicine   [facility]

## 2018-05-06 NOTE — SUBJECTIVE & OBJECTIVE
Interval History:  Pt seen and examined at bedside. D/w RN.  and daughter present. Updated on POC and questions answered. She is tolerating some liquids. No BM so despite enemas.       Review of Systems   Unable to perform ROS: Dementia     Objective:     Vital Signs (Most Recent):  Temp: 98.4 °F (36.9 °C) (05/06/18 0813)  Pulse: 79 (05/06/18 0813)  Resp: 18 (05/06/18 0813)  BP: 133/63 (05/06/18 0813)  SpO2: 96 % (05/06/18 0821) Vital Signs (24h Range):  Temp:  [97.4 °F (36.3 °C)-98.9 °F (37.2 °C)] 98.4 °F (36.9 °C)  Pulse:  [75-97] 79  Resp:  [16-20] 18  SpO2:  [92 %-99 %] 96 %  BP: (133-173)/(63-73) 133/63     Weight: 64.2 kg (141 lb 8.6 oz)  Body mass index is 24.29 kg/m².    Intake/Output Summary (Last 24 hours) at 05/06/18 1138  Last data filed at 05/06/18 0645   Gross per 24 hour   Intake          1439.17 ml   Output                0 ml   Net          1439.17 ml      Physical Exam   Constitutional: She appears well-developed and well-nourished. No distress.   Cardiovascular: Normal rate, regular rhythm, normal heart sounds and intact distal pulses.    Pulmonary/Chest: Effort normal and breath sounds normal. No respiratory distress. She has no wheezes. She has no rales.   Abdominal: She exhibits distension. There is tenderness.   Hyperactive bowel sounds   Neurological: She is alert.   Not oriented   Skin: Skin is warm and dry. She is not diaphoretic.   Psychiatric:   Unable to assess due to profound dementia   Nursing note and vitals reviewed.      Significant Labs:   BMP:     Recent Labs  Lab 05/05/18 0544 05/06/18 0804   * 212*    143   K 3.3* 3.2*    111*   CO2 20* 21*   BUN 42* 38*   CREATININE 0.9 1.0   CALCIUM 9.4 9.3   MG 2.2  --      CBC:     Recent Labs  Lab 05/05/18 0544 05/06/18  0804   WBC 14.04* 13.27*   HGB 13.4 13.0   HCT 40.5 38.6    242     All pertinent labs within the past 24 hours have been reviewed.    Significant Imaging: I have reviewed all pertinent  imaging results/findings within the past 24 hours.

## 2018-05-07 PROBLEM — J69.0 ASPIRATION PNEUMONIA: Status: ACTIVE | Noted: 2018-05-07

## 2018-05-07 LAB
ANION GAP SERPL CALC-SCNC: 13 MMOL/L
BASOPHILS # BLD AUTO: 0.02 K/UL
BASOPHILS NFR BLD: 0.2 %
BUN SERPL-MCNC: 50 MG/DL
CALCIUM SERPL-MCNC: 9.4 MG/DL
CHLORIDE SERPL-SCNC: 112 MMOL/L
CO2 SERPL-SCNC: 21 MMOL/L
CREAT SERPL-MCNC: 1.4 MG/DL
DIFFERENTIAL METHOD: ABNORMAL
EOSINOPHIL # BLD AUTO: 0 K/UL
EOSINOPHIL NFR BLD: 0.2 %
ERYTHROCYTE [DISTWIDTH] IN BLOOD BY AUTOMATED COUNT: 13 %
EST. GFR  (AFRICAN AMERICAN): 38 ML/MIN/1.73 M^2
EST. GFR  (NON AFRICAN AMERICAN): 33 ML/MIN/1.73 M^2
GLUCOSE SERPL-MCNC: 190 MG/DL
HCT VFR BLD AUTO: 38.8 %
HGB BLD-MCNC: 12.9 G/DL
LYMPHOCYTES # BLD AUTO: 1.1 K/UL
LYMPHOCYTES NFR BLD: 12 %
MCH RBC QN AUTO: 33.1 PG
MCHC RBC AUTO-ENTMCNC: 33.2 G/DL
MCV RBC AUTO: 100 FL
MONOCYTES # BLD AUTO: 0.7 K/UL
MONOCYTES NFR BLD: 7.3 %
NEUTROPHILS # BLD AUTO: 7.4 K/UL
NEUTROPHILS NFR BLD: 80.2 %
PLATELET # BLD AUTO: 218 K/UL
PMV BLD AUTO: 11.2 FL
POCT GLUCOSE: 179 MG/DL (ref 70–110)
POCT GLUCOSE: 179 MG/DL (ref 70–110)
POTASSIUM SERPL-SCNC: 3.2 MMOL/L
RBC # BLD AUTO: 3.9 M/UL
SODIUM SERPL-SCNC: 146 MMOL/L
WBC # BLD AUTO: 9.26 K/UL

## 2018-05-07 PROCEDURE — 94761 N-INVAS EAR/PLS OXIMETRY MLT: CPT

## 2018-05-07 PROCEDURE — 27000221 HC OXYGEN, UP TO 24 HOURS

## 2018-05-07 PROCEDURE — 99900035 HC TECH TIME PER 15 MIN (STAT)

## 2018-05-07 PROCEDURE — 99233 SBSQ HOSP IP/OBS HIGH 50: CPT | Mod: ,,, | Performed by: INTERNAL MEDICINE

## 2018-05-07 PROCEDURE — 36415 COLL VENOUS BLD VENIPUNCTURE: CPT

## 2018-05-07 PROCEDURE — 85025 COMPLETE CBC W/AUTO DIFF WBC: CPT

## 2018-05-07 PROCEDURE — 63600175 PHARM REV CODE 636 W HCPCS: Performed by: INTERNAL MEDICINE

## 2018-05-07 PROCEDURE — 25000003 PHARM REV CODE 250: Performed by: INTERNAL MEDICINE

## 2018-05-07 PROCEDURE — 11000001 HC ACUTE MED/SURG PRIVATE ROOM

## 2018-05-07 PROCEDURE — 80048 BASIC METABOLIC PNL TOTAL CA: CPT

## 2018-05-07 RX ORDER — MORPHINE SULFATE 2 MG/ML
1 INJECTION, SOLUTION INTRAMUSCULAR; INTRAVENOUS
Status: DISCONTINUED | OUTPATIENT
Start: 2018-05-07 | End: 2018-05-08 | Stop reason: HOSPADM

## 2018-05-07 RX ORDER — SCOLOPAMINE TRANSDERMAL SYSTEM 1 MG/1
1 PATCH, EXTENDED RELEASE TRANSDERMAL
Status: DISCONTINUED | OUTPATIENT
Start: 2018-05-07 | End: 2018-05-08 | Stop reason: HOSPADM

## 2018-05-07 RX ORDER — ONDANSETRON 2 MG/ML
4 INJECTION INTRAMUSCULAR; INTRAVENOUS EVERY 6 HOURS PRN
Status: DISCONTINUED | OUTPATIENT
Start: 2018-05-07 | End: 2018-05-08 | Stop reason: HOSPADM

## 2018-05-07 RX ORDER — LORAZEPAM 1 MG/1
1 TABLET ORAL
Status: DISCONTINUED | OUTPATIENT
Start: 2018-05-07 | End: 2018-05-08 | Stop reason: HOSPADM

## 2018-05-07 RX ORDER — HYDRALAZINE HYDROCHLORIDE 20 MG/ML
5 INJECTION INTRAMUSCULAR; INTRAVENOUS EVERY 6 HOURS PRN
Status: DISCONTINUED | OUTPATIENT
Start: 2018-05-07 | End: 2018-05-07

## 2018-05-07 RX ORDER — POTASSIUM CHLORIDE 7.45 MG/ML
20 INJECTION INTRAVENOUS ONCE
Status: COMPLETED | OUTPATIENT
Start: 2018-05-07 | End: 2018-05-07

## 2018-05-07 RX ADMIN — POTASSIUM CHLORIDE 20 MEQ: 7.46 INJECTION, SOLUTION INTRAVENOUS at 09:05

## 2018-05-07 RX ADMIN — SCOPALAMINE 1 PATCH: 1 PATCH, EXTENDED RELEASE TRANSDERMAL at 04:05

## 2018-05-07 RX ADMIN — AMPICILLIN AND SULBACTAM 1.5 G: 1; .5 INJECTION, POWDER, FOR SOLUTION INTRAVENOUS at 06:05

## 2018-05-07 NOTE — PROGRESS NOTES
"[facility] Hospital Medicine  Progress Note    Patient Name: Steph Poon  MRN: [id number]  Patient Class: IP- Inpatient   Admission Date: 5/3/2018  Length of Stay: 3 days  Attending Physician: Fariba Garner MD  Primary Care Provider: Stef Murry Sr, MD        Subjective:     Principal Problem:Fecal impaction    HPI:  Ms. Steph Poon is a 89 y.o. female, with history including Alzheimer's dementia and atrial fibrillation, who was brought in by spouse for concern of gradually excessive sleeping over the past six months. He states that patient was evaluated for this and informed that it was attributed to her Alzheimer's progression. Her spouse additionally reports abdominal distention which was noticed when he woke the patient up today at 10AM. He states that this is new as of today. He reports that the patient has had "loose watery" bowel movements over the 2-3 days which is also new. Spouse states that the patient has not been consuming solid foods over the past week. He states that she was previously eating scrambled eggs but has since been consuming 3-4 ensure drinks per day. He denies fever, chills, or vomiting. Spouse denies recent abx use. He also denies patient having a history of C-diff or any abdominal surgeries. Spouse reports himself as possible recent sick contact. He denies any new medications to the patient's routine meds. She sees Dr. Medrano. Patient's history and review of systems are provided by spouse/caregiver secondary to her dementia status.     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  Surgery consulted.    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 She was made NPO and started on IVF. Surgery and GI was consulted. She had incidental finding of choledocholithiasis but  was clear that he did not want any surgical interventions and wanted her to be DNR. She had episode of rectal bleeding after an enema that was self limited and resolved. She continued to be distended so continuing with enemas and PO bowel regimen. On 5/6 she vomited and aspirated. CXR showed infiltration in R lung and she was started on empiric Unasyn. She had no improvement after days of treatment and family wants to consider comfort care    Interval History:  Pt seen and examined at bedside. D/w RN.  and daughter present. Updated on POC and questions answered. She aspirated yesterday and NGT was placed. Started on Unasyn for empiric coverage. Family considering comfort care since she has had no improvement since admission.    Review of Systems   Unable to perform ROS: Dementia     Objective:     Vital Signs (Most Recent):  Temp: 99.3 °F (37.4 °C) (05/07/18 0814)  Pulse: 94 (05/07/18 0814)  Resp: (!) 32 (05/07/18 0814)  BP: (!) 148/65 (05/07/18 0814)  SpO2: (!) 91 % (05/07/18 0814) Vital Signs (24h Range):  Temp:  [97.6 °F (36.4 °C)-99.5 °F (37.5 °C)] 99.3 °F (37.4 °C)  Pulse:  [66-94] 94  Resp:  [16-32] 32  SpO2:  [87 %-94 %] 91 %  BP: (122-148)/(59-65) 148/65     Weight: 64.2 kg (141 lb 8.6 oz)  Body mass index is 24.29 kg/m².    Intake/Output Summary (Last 24 hours) at 05/07/18 1213  Last data filed at 05/07/18 0621   Gross per 24 hour   Intake              528 ml   Output             1600 ml   Net            -1072 ml      Physical Exam   Constitutional: She appears well-developed and well-nourished. No distress.   Cardiovascular: Normal rate, regular rhythm, normal heart sounds and intact distal pulses.    Pulmonary/Chest: Effort normal and breath sounds normal. No  respiratory distress. She has no wheezes. She has no rales.   Abdominal: She exhibits distension. There is tenderness.   Hyperactive bowel sounds   Neurological: She is alert.   Not oriented   Skin: Skin is warm and dry. She is not diaphoretic.   Psychiatric:   Unable to assess due to profound dementia   Nursing note and vitals reviewed.      Significant Labs:   BMP:     Recent Labs  Lab 05/07/18  0533   *   *   K 3.2*   *   CO2 21*   BUN 50*   CREATININE 1.4   CALCIUM 9.4     CBC:     Recent Labs  Lab 05/06/18  0804 05/07/18  0533   WBC 13.27* 9.26   HGB 13.0 12.9   HCT 38.6 38.8    218     All pertinent labs within the past 24 hours have been reviewed.    Significant Imaging: I have reviewed all pertinent imaging results/findings within the past 24 hours.    Assessment/Plan:      * Fecal impaction      Abd CT shows copious amount of stool in large bowel with distension.  Suspect due to fecal impaction. Surgery consulted but family does not want any surgical intervention  No improvement so far with conservative measures.  Continue NGT to LIWS.  Family considering comfort care. Hold off on further enemas  Family meeting this afternoon              Aspiration pneumonia    Aspirated 5/6 after episode of emesis  Made NPO and NGT placed  On empiric Unasyn given presence of bowel obstruction  Aspiration precautions          Rectal bleeding    Resolved. Likely due to trauma from enema. Also with external hemorrhoids  Hold eliquis  Anusol cream BID          Megacolon    - NPO  - IV fluids  - PRN pain meds  - surgery consulted           Hyperglycemia    - improved after IV fluids         Choledocholithiasis    Incidental finding. LFTs WNL.  Family wants no surgical intervention        Dementia    Delirium precautions.  D/w family. They do now want any life support or heroic measures  Change code status to DNR            Hypertension    Home meds held due to NPO  Hydralazine PRN          Diabetes  mellitus, type 2    Continue SSI while NPO        Atrial fibrillation    Hold carvedilol, digoxin while NPO  Hold eliquis 2/2 rectal bleeding  Has been rate controlled          VTE Risk Mitigation     None              Fariba Garner MD  Department of Hospital Medicine   [facility]

## 2018-05-07 NOTE — ASSESSMENT & PLAN NOTE
Aspirated 5/6 after episode of emesis  Made NPO and NGT placed  On empiric Unasyn given presence of bowel obstruction  Aspiration precautions

## 2018-05-07 NOTE — PROGRESS NOTES
Met with family at bedside (patient's  and 3 daughters). They are all in agreement to stop current treatment with the goal to transition to comfort care and hospice. They do not want to continue with antibiotics or IVF. Comfort care orders placed. RN notified. CM will discuss options with family.

## 2018-05-07 NOTE — PLAN OF CARE
Met with patient's  & daughters at bedside - they all voiced their preference as [facility] - referral forwarded via [facility] - Spoke with Prasanna from [facility] who will review referral & set up family meeting

## 2018-05-07 NOTE — NURSING
NG tube removed by patient. New NG tube placed by RN. Hospitalist notified. Chela from [facility] notified. Awaiting placement verification.

## 2018-05-07 NOTE — PLAN OF CARE
Problem: Dying Patient, Actively (Adult)  Goal: Identify Related Risk Factors and Signs and Symptoms  Related risk factors and signs and symptoms are identified upon initiation of Human Response Clinical Practice Guideline (CPG)   Outcome: Ongoing (interventions implemented as appropriate)   05/07/18 1820   Dying Patient, Actively   Related Risk Factors (Actively Dying Patient) anticipatory loss;emotional state;family dynamics;impaired swallowing   Signs and Symptoms (Actively Dying Patient) total dependency;changes in bowel elimination pattern

## 2018-05-07 NOTE — PROGRESS NOTES
Subjective:       Patient ID: Steph Poon is a 89 y.o. female.    Chief Complaint:  Abdominal Pain (distention and diarrhea x2 days)       History of Present Illness  Pt resting comfortably, family does not want any invasive procedures    Review of Systems  Respiratory: negative      Objective:     Vitals:    05/06/18 2359 05/07/18 0504 05/07/18 0700 05/07/18 0814   BP: (!) 122/59 126/60  (!) 148/65   BP Location: Left arm Left arm     Patient Position: Lying Lying  Lying   Pulse: 74 66  94   Resp: 20 20  (!) 32   Temp: 98.2 °F (36.8 °C) 99.1 °F (37.3 °C)  99.3 °F (37.4 °C)   TempSrc: Oral Axillary  Axillary   SpO2: (!) 93% (!) 92% (!) 94% (!) 91%   Weight:       Height:          Abdomen: soft, non-tender; bowel sounds normal; no masses,  no organomegaly    Data Review   Recent Results (from the past 336 hour(s))   CBC with Automated Differential    Collection Time: 05/07/18  5:33 AM   Result Value Ref Range    WBC 9.26 3.90 - 12.70 K/uL    Hemoglobin 12.9 12.0 - 16.0 g/dL    Hematocrit 38.8 37.0 - 48.5 %    Platelets 218 150 - 350 K/uL   CBC with Automated Differential    Collection Time: 05/06/18  8:04 AM   Result Value Ref Range    WBC 13.27 (H) 3.90 - 12.70 K/uL    Hemoglobin 13.0 12.0 - 16.0 g/dL    Hematocrit 38.6 37.0 - 48.5 %    Platelets 242 150 - 350 K/uL   CBC with Automated Differential    Collection Time: 05/05/18  5:44 AM   Result Value Ref Range    WBC 14.04 (H) 3.90 - 12.70 K/uL    Hemoglobin 13.4 12.0 - 16.0 g/dL    Hematocrit 40.5 37.0 - 48.5 %    Platelets 233 150 - 350 K/uL        Recent Labs  Lab 05/03/18  1648   INR 1.0       Recent Labs  Lab 05/03/18  1648  05/05/18  0544 05/06/18  0804 05/07/18  0533     < > 142 143 146*   K 4.2  < > 3.3* 3.2* 3.2*     < > 108 111* 112*   CO2 24  < > 20* 21* 21*   BUN 55*  < > 42* 38* 50*   CREATININE 1.5*  < > 0.9 1.0 1.4   CALCIUM 10.8*  < > 9.4 9.3 9.4   PROT 7.1  --   --   --   --    BILITOT 0.9  --   --   --   --    ALKPHOS 65  --   --    --   --    ALT 11  --   --   --   --    AST 15  --   --   --   --    < > = values in this interval not displayed. No results found for: HAV, HEPAIGM, HEPBIGM, HEPBCAB, HBEAG, HEPCAB No results found for: AFP   No results found for: CEA     Recent Labs  Lab 05/03/18  1648   INR 1.0        Assessment:     1. Abdominal pain    2. Megacolon    3. Acute kidney injury    4. Hyperglycemia    5. Diverticulosis of intestine without bleeding, unspecified intestinal tract location    6. Choledocholithiasis    7. Cholelithiasis without cholecystitis    8. Fecal impaction        Plan:     1) Continue with supportive care

## 2018-05-07 NOTE — ASSESSMENT & PLAN NOTE
Abd CT shows copious amount of stool in large bowel with distension.  Suspect due to fecal impaction. Surgery consulted but family does not want any surgical intervention  No improvement so far with conservative measures.  Continue NGT to LIWS.  Family considering comfort care. Hold off on further enemas  Family meeting this afternoon

## 2018-05-07 NOTE — PLAN OF CARE
Patient resting in no apparent distress with safety measures in place. Daughter at bedside. NG tube set to low intermittent wall suction. IVF infusing. VSS on RA. Will continue to monitor.

## 2018-05-07 NOTE — PLAN OF CARE
Prasanna from [facility] spoke with patient's  Carlos & will visit [facility] tomorrow at 9am to complete admission consents. Plan to transfer to inpatient hospice tomorrow. MD updated

## 2018-05-07 NOTE — PROGRESS NOTES
Briefly visited with patient and family whom I know well. Expressed agreement with plans for hospice care. Let family know that we would be available as needed. Plans for [facility] tomorrow.

## 2018-05-07 NOTE — SUBJECTIVE & OBJECTIVE
Interval History:  Pt seen and examined at bedside. D/w RN.  and daughter present. Updated on POC and questions answered. She aspirated yesterday and NGT was placed. Started on Unasyn for empiric coverage. Family considering comfort care since she has had no improvement since admission.    Review of Systems   Unable to perform ROS: Dementia     Objective:     Vital Signs (Most Recent):  Temp: 99.3 °F (37.4 °C) (05/07/18 0814)  Pulse: 94 (05/07/18 0814)  Resp: (!) 32 (05/07/18 0814)  BP: (!) 148/65 (05/07/18 0814)  SpO2: (!) 91 % (05/07/18 0814) Vital Signs (24h Range):  Temp:  [97.6 °F (36.4 °C)-99.5 °F (37.5 °C)] 99.3 °F (37.4 °C)  Pulse:  [66-94] 94  Resp:  [16-32] 32  SpO2:  [87 %-94 %] 91 %  BP: (122-148)/(59-65) 148/65     Weight: 64.2 kg (141 lb 8.6 oz)  Body mass index is 24.29 kg/m².    Intake/Output Summary (Last 24 hours) at 05/07/18 1213  Last data filed at 05/07/18 0621   Gross per 24 hour   Intake              528 ml   Output             1600 ml   Net            -1072 ml      Physical Exam   Constitutional: She appears well-developed and well-nourished. No distress.   Cardiovascular: Normal rate, regular rhythm, normal heart sounds and intact distal pulses.    Pulmonary/Chest: Effort normal and breath sounds normal. No respiratory distress. She has no wheezes. She has no rales.   Abdominal: She exhibits distension. There is tenderness.   Hyperactive bowel sounds   Neurological: She is alert.   Not oriented   Skin: Skin is warm and dry. She is not diaphoretic.   Psychiatric:   Unable to assess due to profound dementia   Nursing note and vitals reviewed.      Significant Labs:   BMP:     Recent Labs  Lab 05/07/18  0533   *   *   K 3.2*   *   CO2 21*   BUN 50*   CREATININE 1.4   CALCIUM 9.4     CBC:     Recent Labs  Lab 05/06/18  0804 05/07/18  0533   WBC 13.27* 9.26   HGB 13.0 12.9   HCT 38.6 38.8    218     All pertinent labs within the past 24 hours have been  reviewed.    Significant Imaging: I have reviewed all pertinent imaging results/findings within the past 24 hours.

## 2018-05-08 VITALS
TEMPERATURE: 99 F | WEIGHT: 141.56 LBS | SYSTOLIC BLOOD PRESSURE: 141 MMHG | BODY MASS INDEX: 24.17 KG/M2 | HEART RATE: 92 BPM | HEIGHT: 64 IN | RESPIRATION RATE: 22 BRPM | OXYGEN SATURATION: 96 % | DIASTOLIC BLOOD PRESSURE: 67 MMHG

## 2018-05-08 PROCEDURE — 99239 HOSP IP/OBS DSCHRG MGMT >30: CPT | Mod: ,,, | Performed by: HOSPITALIST

## 2018-05-08 NOTE — PLAN OF CARE
Problem: Patient Care Overview  Goal: Plan of Care Review  Outcome: Ongoing (interventions implemented as appropriate)  Pt in no distress on 2L NC, no changes at this time.

## 2018-05-08 NOTE — PROGRESS NOTES
Sat with family/pt.  Long discussion about transition to [facility].  Pt awake at times and in NAD.  Questions answered.  Awaiting transfer.      Will alert our office and Dr. Jeanmarie Mccabe.

## 2018-05-08 NOTE — PLAN OF CARE
Patient resting in no apparent distress. Daughter at bedside.  Purposeful rounding performed. Safety measures maintained. Daughter instructed to use call light for assistance. Will continue to monitor.

## 2018-05-08 NOTE — PLAN OF CARE
Prasanna from [facility] facility reports they are ready to accept patient. RN has the number to call report. Transportation set up with [facility]. Pickup scheduled for 11:45am. Family aware      05/08/18 1050   Final Note   Assessment Type Final Discharge Note   Discharge Disposition [facility]   What phone number can be called within the next 1-3 days to see how you are doing after discharge? [phone]   Discharge plans and expectations educations in teach back method with documentation complete? Yes   [facility] Referral Info   Facility Name [facility]

## 2018-05-08 NOTE — PLAN OF CARE
[facility]  [address]  [place] [address]  [phone] after hours  [phone]  Fax                                   HOSPICE  ORDERS     05/08/2018    Admit to Hospice:   Inpatient Service     Diagnoses:  Active Hospital Problems    Diagnosis  POA    *Fecal impaction [K56.41]  Yes    Aspiration pneumonia [J69.0]  No    Rectal bleeding [K62.5]  No    Choledocholithiasis [K80.50]  Yes    Dementia [F03.90]  Yes     2014: Diagnosed.      Hypertension [I10]  Yes     1990: Diagnosed.      Atrial fibrillation [I48.91]  Yes     1998: Diagnosed with chronic atrial fibrillation.  CHADS2=3 (HAD). [id number]=8 (HA2DSc).  On rivaroxaban.      Diabetes mellitus, type 2 [E11.9]  Yes     2000: Diagnosed. Complications: None. Medications: Diet.        Resolved Hospital Problems    Diagnosis Date Resolved POA   No resolved problems to display.       Vital Signs: Routine.    Allergies:  Review of patient's allergies indicates:   Allergen Reactions    Ace inhibitors Other (See Comments)     cough    Quinine Other (See Comments)     unknown       Diet: Regular as tolerated    Activities: As tolerated    Nursing: Per Hospice Routine    Medications:         Comfort Care Medications Only            _________________________________  Magda Plata MD  05/08/2018

## 2018-05-08 NOTE — PROGRESS NOTES
Subjective:       Patient ID: Steph Poon is a 89 y.o. female.    Chief Complaint:  Abdominal Pain (distention and diarrhea x2 days)       History of Present Illness  Pt resting comfortably  NG tube out    Review of Systems  Cardiovascular: negative      Objective:     Vitals:    05/08/18 0104 05/08/18 0114 05/08/18 0451 05/08/18 0822   BP: (!) 200/78 (!) 159/68 (!) 174/74 (!) 141/67   BP Location: Right arm Right arm Right arm Right arm   Patient Position: Lying Lying Lying Lying   Pulse: 95 87 89 92   Resp: 18 18 (!) 22   Temp: 98.4 °F (36.9 °C)  97.9 °F (36.6 °C) 99 °F (37.2 °C)   TempSrc: Axillary  Axillary Oral   SpO2: 95%  (!) 94% 96%   Weight:       Height:          Abdomen: soft, non-tender; bowel sounds normal; no masses,  no organomegaly    Data Review   Recent Results (from the past 336 hour(s))   CBC with Automated Differential    Collection Time: 05/07/18  5:33 AM   Result Value Ref Range    WBC 9.26 3.90 - 12.70 K/uL    Hemoglobin 12.9 12.0 - 16.0 g/dL    Hematocrit 38.8 37.0 - 48.5 %    Platelets 218 150 - 350 K/uL   CBC with Automated Differential    Collection Time: 05/06/18  8:04 AM   Result Value Ref Range    WBC 13.27 (H) 3.90 - 12.70 K/uL    Hemoglobin 13.0 12.0 - 16.0 g/dL    Hematocrit 38.6 37.0 - 48.5 %    Platelets 242 150 - 350 K/uL   CBC with Automated Differential    Collection Time: 05/05/18  5:44 AM   Result Value Ref Range    WBC 14.04 (H) 3.90 - 12.70 K/uL    Hemoglobin 13.4 12.0 - 16.0 g/dL    Hematocrit 40.5 37.0 - 48.5 %    Platelets 233 150 - 350 K/uL        Recent Labs  Lab 05/03/18  1648   INR 1.0       Recent Labs  Lab 05/03/18  1648  05/05/18  0544 05/06/18  0804 05/07/18  0533     < > 142 143 146*   K 4.2  < > 3.3* 3.2* 3.2*     < > 108 111* 112*   CO2 24  < > 20* 21* 21*   BUN 55*  < > 42* 38* 50*   CREATININE 1.5*  < > 0.9 1.0 1.4   CALCIUM 10.8*  < > 9.4 9.3 9.4   PROT 7.1  --   --   --   --    BILITOT 0.9  --   --   --   --    ALKPHOS 65  --   --   --    --    ALT 11  --   --   --   --    AST 15  --   --   --   --    < > = values in this interval not displayed. No results found for: HAV, HEPAIGM, HEPBIGM, HEPBCAB, HBEAG, HEPCAB No results found for: AFP   No results found for: CEA     Recent Labs  Lab 05/03/18  1648   INR 1.0        Assessment:     1. Abdominal pain    2. Megacolon    3. Acute kidney injury    4. Hyperglycemia    5. Diverticulosis of intestine without bleeding, unspecified intestinal tract location    6. Choledocholithiasis    7. Cholelithiasis without cholecystitis    8. Fecal impaction        Plan:     1) Pt for hospice  Discussed with family

## 2018-05-08 NOTE — DISCHARGE SUMMARY
"[facility] Hospital Medicine  Discharge Summary      Patient Name: Steph Poon  MRN: [id number]  Admission Date: 5/3/2018  Hospital Length of Stay: 4 days  Discharge Date and Time:  05/08/2018 10:22 AM  Attending Physician: Magda Plata MD   Discharging Provider: Magda Plata MD  Primary Care Provider: Stef Murry Sr, MD      HPI:   Ms. Steph Poon is a 89 y.o. female, with history including Alzheimer's dementia and atrial fibrillation, who was brought in by spouse for concern of gradually excessive sleeping over the past six months. He states that patient was evaluated for this and informed that it was attributed to her Alzheimer's progression. Her spouse additionally reports abdominal distention which was noticed when he woke the patient up today at 10AM. He states that this is new as of today. He reports that the patient has had "loose watery" bowel movements over the 2-3 days which is also new. Spouse states that the patient has not been consuming solid foods over the past week. He states that she was previously eating scrambled eggs but has since been consuming 3-4 ensure drinks per day. He denies fever, chills, or vomiting. Spouse denies recent abx use. He also denies patient having a history of C-diff or any abdominal surgeries. Spouse reports himself as possible recent sick contact. He denies any new medications to the patient's routine meds. She sees Dr. Medrano. Patient's history and review of systems are provided by spouse/caregiver secondary to her dementia status.     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  Surgery consulted.    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 She was made NPO and started on IVF. Surgery and GI was consulted. She had incidental finding of choledocholithiasis but  was clear that he did not want any surgical interventions and wanted her to be DNR. She had episode of rectal bleeding after an enema that was self limited and resolved. She continued to be distended so continuing with enemas and PO bowel regimen. On 5/6 she vomited and aspirated. CXR showed infiltration in R lung and she was started on empiric Unasyn. She had no improvement after days of treatment and family wants to comfort care only so the patient will be discharged to inpatient hospice at [facility].       Consults:   Consults         Status Ordering Provider     Inpatient consult to Gastroenterology  Once     Provider:  Lori Davila MD    Completed JIMI CALDERON     Inpatient consult to General Surgery  Once     Provider:  Rich Plaza MD    Completed JIMI CALDERON          No new Assessment & Plan notes have been filed under this hospital service since the last note was generated.  Service: Hospital Medicine    Final Active Diagnoses:    Diagnosis Date Noted POA    PRINCIPAL PROBLEM:  Fecal impaction [K56.41] 05/03/2018 Yes    Aspiration pneumonia [J69.0] 05/07/2018 No    Rectal bleeding [K62.5] 05/05/2018 No    Choledocholithiasis [K80.50] 05/04/2018 Yes    Dementia [F03.90] 01/16/2017 Yes    Hypertension [I10] 07/31/2015 Yes    Atrial fibrillation [I48.91]  Yes    Diabetes mellitus, type 2 [E11.9]  Yes      Problems Resolved During this Admission:    Diagnosis Date Noted Date Resolved POA       Discharged Condition: poor    Disposition: Hospice/Medical Facility    Follow Up:    Patient Instructions:     Diet Adult Regular     Activity as tolerated         Significant Diagnostic  Studies: Labs:   CMP   Recent Labs  Lab 05/07/18  0533   *   K 3.2*   *   CO2 21*   *   BUN 50*   CREATININE 1.4   CALCIUM 9.4   ANIONGAP 13   ESTGFRAFRICA 38*   EGFRNONAA 33*    and CBC   Recent Labs  Lab 05/07/18  0533   WBC 9.26   HGB 12.9   HCT 38.8          Pending Diagnostic Studies:     None         Medications:  Reconciled Home Medications:      Medication List      STOP taking these medications    amLODIPine 5 MG tablet  Commonly known as:  NORVASC     apixaban 2.5 mg Tab     carvedilol 12.5 MG tablet  Commonly known as:  COREG     CENTRUM 0.4-162-18 mg Tab  Generic drug:  mv-min-FA-Ra-Ga-bhqluww-lutein     digoxin 125 mcg tablet  Commonly known as:  LANOXIN     furosemide 20 MG tablet  Commonly known as:  LASIX     losartan 100 MG tablet  Commonly known as:  COZAAR     potassium chloride 10 MEQ Cpsr  Commonly known as:  MICRO-K     PREMARIN vaginal cream  Generic drug:  conjugated estrogens     rosuvastatin 10 MG tablet  Commonly known as:  CRESTOR     TRUEPLUS LANCETS 28 gauge Misc  Generic drug:  lancets     TRUERESULT BLOOD GLUCOSE SYSTM Misc  Generic drug:  blood-glucose meter     TRUETEST LOW GLUCOSE CONTROL Soln  Generic drug:  blood glucose control, low     TRUETEST TEST STRIPS Strp  Generic drug:  blood sugar diagnostic     VITAMIN C 500 MG tablet  Generic drug:  ascorbic acid (vitamin C)     VITAMIN D3 2,000 unit Cap  Generic drug:  cholecalciferol (vitamin D3)            Indwelling Lines/Drains at time of discharge:   Lines/Drains/Airways          No matching active lines, drains, or airways          Time spent on the discharge of patient: 35 minutes  Patient was seen and examined on the date of discharge and determined to be suitable for discharge.         Magda Plata MD  Department of Hospital Medicine  [facility]

## 2022-05-10 NOTE — PROGRESS NOTES
Problem: Adult Inpatient Plan of Care  Goal: Absence of Hospital-Acquired Illness or Injury  Intervention: Identify and Manage Fall Risk  Description: Perform standard risk assessment on admission using a validated tool or comprehensive approach appropriate to the patient; reassess fall risk frequently, with change in status or transfer to another level of care.  Communicate fall injury risk to interprofessional healthcare team.  Determine need for increased observation, equipment and environmental modification, such as low bed, signage and supportive, nonskid footwear.  Adjust safety measures to individual developmental age, stage and identified risk factors.  Reinforce the importance of safety and physical activity with patient and family.  Perform regular intentional rounding to assess need for position change, pain assessment and personal needs, including assistance with toileting.  Recent Flowsheet Documentation  Taken 5/9/2022 2315 by Guy Cano LPN  Safety Promotion/Fall Prevention:   assistive device/personal items within reach   clutter free environment maintained   toileting scheduled   safety round/check completed   lighting adjusted   muscle strengthening facilitated   nonskid shoes/slippers when out of bed  Taken 5/9/2022 2100 by Guy Cano LPN  Safety Promotion/Fall Prevention:   safety round/check completed   room organization consistent   nonskid shoes/slippers when out of bed   muscle strengthening facilitated   mobility aid in reach   lighting adjusted   fall prevention program maintained   clutter free environment maintained   assistive device/personal items within reach   activity supervised  Taken 5/9/2022 1959 by Guy Cano LPN  Safety Promotion/Fall Prevention:   clutter free environment maintained   assistive device/personal items within reach   activity supervised   elopement precautions   fall prevention program maintained   lighting adjusted   mobility aid  Report called to Mg at [facility]. IV removed without complication. Awaiting [facility] transport.   in reach   muscle strengthening facilitated   nonskid shoes/slippers when out of bed   room organization consistent   safety round/check completed  Intervention: Prevent Skin Injury  Description: Perform a screening for skin injury risk, such as pressure or moisture associated skin damage on admission and at regular intervals throughout hospital stay.  Keep all areas of skin (especially folds) clean and dry.  Maintain adequate skin hydration.  Relieve and redistribute pressure and protect bony prominences; implement measures based on patient-specific risk factors.  Match turning and repositioning schedule to clinical condition.  Encourage weight shift frequently; assist with reposition if unable to complete independently.  Float heels off bed; avoid pressure on the Achilles tendon.  Keep skin free from extended contact with medical devices.  Encourage functional activity and mobility, as early as tolerated.  Use aids (e.g., slide boards, mechanical lift) during transfer.  Recent Flowsheet Documentation  Taken 5/9/2022 2315 by Guy Cano LPN  Body Position: weight shifting  Taken 5/9/2022 2100 by Guy Cano LPN  Body Position: weight shifting  Taken 5/9/2022 1959 by Guy Cano LPN  Body Position:   turned   supine  Intervention: Prevent and Manage VTE (Venous Thromboembolism) Risk  Description: Assess for VTE (venous thromboembolism) risk.  Encourage and assist with early ambulation.  Initiate and maintain compression or other therapy, as indicated, based on identified risk in accordance with organizational protocol and provider order.  Encourage both active and passive leg exercises while in bed, if unable to ambulate.  Recent Flowsheet Documentation  Taken 5/9/2022 2315 by Guy Cano LPN  Activity Management: activity encouraged  Taken 5/9/2022 2100 by Guy Cano LPN  Activity Management: bedrest  Taken 5/9/2022 1959 by Guy Cano LPN  Activity  Management:   activity encouraged   bedrest  Intervention: Prevent Infection  Description: Maintain skin and mucous membrane integrity; promote hand, oral and pulmonary hygiene.  Optimize fluid balance, nutrition, sleep and glycemic control to maximize infection resistance.  Identify potential sources of infection early to prevent or mitigate progression of infection (e.g., wound, lines, devices).  Evaluate ongoing need for invasive devices; remove promptly when no longer indicated.  Recent Flowsheet Documentation  Taken 5/9/2022 2315 by Guy Cano LPN  Infection Prevention:   visitors restricted/screened   single patient room provided   rest/sleep promoted   personal protective equipment utilized   hand hygiene promoted  Taken 5/9/2022 1959 by Guy Cano LPN  Infection Prevention:   visitors restricted/screened   single patient room provided   hand hygiene promoted   personal protective equipment utilized   rest/sleep promoted     Problem: Fall Injury Risk  Goal: Absence of Fall and Fall-Related Injury  Intervention: Identify and Manage Contributors  Description: Develop a fall prevention plan with the patient and caregiver/family.  Provide reorientation, appropriate sensory stimulation and routines with changes in mental status to decrease risk of fall.  Promote use of personal vision and auditory aids.  Assess assistance level required for safe and effective self-care; provide support as needed, such as toileting, mobilization. For age 65 and older, implement timed toileting with assistance.  Encourage physical activity, such as performance of mobility and self-care at highest level of patient ability, multicomponent exercise program and provision of appropriate assistive devices.  If fall occurs, assess the severity of injury; implement fall injury protocol. Determine the cause and revise fall injury prevention plan.  Regularly review medication contribution to fall risk; adjust medication  administration times to minimize risk of falling.  Consider risk related to polypharmacy and age.  Balance adequate pain management with potential for oversedation.  Recent Flowsheet Documentation  Taken 5/9/2022 2315 by Guy Cano LPN  Medication Review/Management: medications reviewed  Taken 5/9/2022 2100 by Guy Cano LPN  Medication Review/Management: medications reviewed  Intervention: Promote Injury-Free Environment  Description: Provide a safe, barrier-free environment that encourages independent activity.  Keep care area uncluttered and well-lighted.  Determine need for increased observation or monitoring.  Avoid use of devices that minimize mobility, such as restraints or indwelling urinary catheter.  Recent Flowsheet Documentation  Taken 5/9/2022 2315 by Guy Cano LPN  Safety Promotion/Fall Prevention:   assistive device/personal items within reach   clutter free environment maintained   toileting scheduled   safety round/check completed   lighting adjusted   muscle strengthening facilitated   nonskid shoes/slippers when out of bed  Taken 5/9/2022 2100 by Guy Cano LPN  Safety Promotion/Fall Prevention:   safety round/check completed   room organization consistent   nonskid shoes/slippers when out of bed   muscle strengthening facilitated   mobility aid in reach   lighting adjusted   fall prevention program maintained   clutter free environment maintained   assistive device/personal items within reach   activity supervised  Taken 5/9/2022 1959 by Guy Cano LPN  Safety Promotion/Fall Prevention:   clutter free environment maintained   assistive device/personal items within reach   activity supervised   elopement precautions   fall prevention program maintained   lighting adjusted   mobility aid in reach   muscle strengthening facilitated   nonskid shoes/slippers when out of bed   room organization consistent   safety round/check completed     Problem:  Skin Injury Risk Increased  Goal: Skin Health and Integrity  Intervention: Optimize Skin Protection  Description: Perform a full pressure injury risk assessment, as indicated by screening, upon admission to care unit.  Reassess skin (injury risk, full inspection) frequently (e.g., scheduled interval, with change in condition) to provide optimal early detection and prevention.  Maintain adequate tissue perfusion (e.g., encourage fluid balance; avoid crossing legs, constrictive clothing or devices) to promote tissue oxygenation.  Maintain head of bed at lowest degree of elevation tolerated, considering medical condition and other restrictions.  Avoid positioning onto an area that remains reddened.  Minimize incontinence and moisture (e.g., toileting schedule; moisture-wicking pad, diaper or incontinence collection device; skin moisture barrier).  Cleanse skin promptly and gently when soiled utilizing a pH-balanced cleanser.  Relieve and redistribute pressure (e.g., scheduled position changes, weight shifts, use of support surface, medical device repositioning, protective dressing application, use of positioning device, microclimate control, use of pressure-injury-monitor  Encourage increased activity, such as sitting in a chair at the bedside or early mobilization, when able to tolerate.  Recent Flowsheet Documentation  Taken 5/9/2022 2315 by Guy Cano LPN  Pressure Reduction Techniques: frequent weight shift encouraged  Head of Bed (HOB) Positioning: HOB at 20-30 degrees  Pressure Reduction Devices: specialty bed utilized  Taken 5/9/2022 2100 by Guy Cano LPN  Head of Bed (HOB) Positioning: HOB at 20-30 degrees  Taken 5/9/2022 1959 by Guy Cano LPN  Head of Bed (HOB) Positioning: HOB at 20-30 degrees     Problem: COPD (Chronic Obstructive Pulmonary Disease) Comorbidity  Goal: Maintenance of COPD Symptom Control  Intervention: Maintain COPD-Symptom Control  Description: Evaluate  adherence to management plan (e.g., medication, trigger avoidance, infection prevention, self-monitoring).  Advocate for continuation of home regimen, including medication, method of delivery, schedule and symptom monitoring.  Anticipate the need for breathing techniques and activity pacing to minimize fatigue and breathlessness.  Assess for proper use of inhaled medication and delivery technique; assist or reinstruct if needed.  Evaluate effectiveness of coping skills; encourage expression of feelings, expectations and concerns related to disease management and quality of life; reinforce education to enhance management plan and wellbeing.  Recent Flowsheet Documentation  Taken 5/9/2022 2315 by Guy Cano LPN  Medication Review/Management: medications reviewed  Taken 5/9/2022 2100 by Guy Cano LPN  Medication Review/Management: medications reviewed   Goal Outcome Evaluation: